# Patient Record
Sex: FEMALE | Race: OTHER | Employment: OTHER | URBAN - METROPOLITAN AREA
[De-identification: names, ages, dates, MRNs, and addresses within clinical notes are randomized per-mention and may not be internally consistent; named-entity substitution may affect disease eponyms.]

---

## 2020-02-12 ENCOUNTER — OFFICE VISIT (OUTPATIENT)
Dept: OBGYN CLINIC | Facility: CLINIC | Age: 40
End: 2020-02-12
Payer: COMMERCIAL

## 2020-02-12 VITALS
DIASTOLIC BLOOD PRESSURE: 68 MMHG | HEIGHT: 61 IN | SYSTOLIC BLOOD PRESSURE: 112 MMHG | WEIGHT: 141 LBS | BODY MASS INDEX: 26.62 KG/M2

## 2020-02-12 DIAGNOSIS — Z12.31 SCREENING MAMMOGRAM, ENCOUNTER FOR: ICD-10-CM

## 2020-02-12 DIAGNOSIS — Z01.419 WELL WOMAN EXAM WITH ROUTINE GYNECOLOGICAL EXAM: Primary | ICD-10-CM

## 2020-02-12 DIAGNOSIS — Z87.42 HISTORY OF OVARIAN CYST: ICD-10-CM

## 2020-02-12 DIAGNOSIS — N92.6 MISSED MENSES: ICD-10-CM

## 2020-02-12 PROBLEM — O09.511 PRIMIGRAVIDA OF ADVANCED MATERNAL AGE IN FIRST TRIMESTER: Status: ACTIVE | Noted: 2020-02-12

## 2020-02-12 LAB
CONTROL LINE PRESENT WITH A CLEAR BACKGROUND (YES/NO): YES YES/NO
KIT LOT #: NORMAL NUMERIC

## 2020-02-12 PROCEDURE — 99203 OFFICE O/P NEW LOW 30 MIN: CPT | Performed by: OBSTETRICS & GYNECOLOGY

## 2020-02-12 PROCEDURE — 81025 URINE PREGNANCY TEST: CPT | Performed by: OBSTETRICS & GYNECOLOGY

## 2020-02-12 NOTE — PROGRESS NOTES
HPI:    Patient ID: Alba Genao is a 35year old female. Patient here for PCV. Had a cyst in [facility] and took OCPs x 3 months. LMP 12/30/2019. Unsure of dates. Will send for OB U/S to confirm dates. Risk Factor is AMA. Diet and exercise discussed.

## 2020-02-13 ENCOUNTER — TELEPHONE (OUTPATIENT)
Dept: OBGYN CLINIC | Facility: CLINIC | Age: 40
End: 2020-02-13

## 2020-02-13 DIAGNOSIS — N92.6 MISSED MENSES: Primary | ICD-10-CM

## 2020-02-14 NOTE — TELEPHONE ENCOUNTER
RN returned call to patient who indicates that she has active insurance and would like to have her chart updated to reflect this information so that insurance is billed (not her) for office visit yesterday.       Pt also indicates that she was advised by in

## 2020-02-17 ENCOUNTER — APPOINTMENT (OUTPATIENT)
Dept: LAB | Facility: HOSPITAL | Age: 40
End: 2020-02-17
Attending: OBSTETRICS & GYNECOLOGY
Payer: COMMERCIAL

## 2020-02-17 LAB — B-HCG SERPL-ACNC: ABNORMAL MIU/ML

## 2020-02-17 PROCEDURE — 36415 COLL VENOUS BLD VENIPUNCTURE: CPT | Performed by: OBSTETRICS & GYNECOLOGY

## 2020-02-17 PROCEDURE — 84702 CHORIONIC GONADOTROPIN TEST: CPT | Performed by: OBSTETRICS & GYNECOLOGY

## 2020-02-18 ENCOUNTER — TELEPHONE (OUTPATIENT)
Dept: OBGYN CLINIC | Facility: CLINIC | Age: 40
End: 2020-02-18

## 2020-02-18 NOTE — TELEPHONE ENCOUNTER
----- Message from Erin Valverde MD sent at 2/17/2020  8:28 PM CST -----  [facility] is over 30,000. Patient can schedule her OB U/S if she straightened out her Insurance issues. Call patient.

## 2020-02-25 ENCOUNTER — NURSE ONLY (OUTPATIENT)
Dept: OBGYN CLINIC | Facility: CLINIC | Age: 40
End: 2020-02-25
Payer: COMMERCIAL

## 2020-02-25 DIAGNOSIS — Z34.01 ENCOUNTER FOR SUPERVISION OF NORMAL FIRST PREGNANCY IN FIRST TRIMESTER: Primary | ICD-10-CM

## 2020-02-25 NOTE — PROGRESS NOTES
OB History     T0    L0    SAB0  TAB0  Ectopic0  Multiple0  Live Births0         Pt is here today for RN [employer].     Missed menses apt with: MLM   LMP: 2019    Pre  BMI: 26.29     EPDS score:   +UPT at home: 2020  +UP

## 2020-02-26 ENCOUNTER — HOSPITAL ENCOUNTER (OUTPATIENT)
Dept: ULTRASOUND IMAGING | Age: 40
Discharge: HOME | End: 2020-02-26
Attending: OBSTETRICS & GYNECOLOGY
Payer: COMMERCIAL

## 2020-02-26 ENCOUNTER — APPOINTMENT (OUTPATIENT)
Dept: LAB | Age: 40
End: 2020-02-26
Attending: OBSTETRICS & GYNECOLOGY
Payer: COMMERCIAL

## 2020-02-26 DIAGNOSIS — Z87.42 HISTORY OF OVARIAN CYST: ICD-10-CM

## 2020-02-26 PROCEDURE — 76801 OB US < 14 WKS SINGLE FETUS: CPT | Performed by: OBSTETRICS & GYNECOLOGY

## 2020-02-28 ENCOUNTER — TELEPHONE (OUTPATIENT)
Dept: OBGYN CLINIC | Facility: CLINIC | Age: 40
End: 2020-02-28

## 2020-02-28 RX ORDER — AMOXICILLIN 500 MG/1
500 CAPSULE ORAL 3 TIMES DAILY
Qty: 21 CAPSULE | Refills: 0 | Status: SHIPPED | OUTPATIENT
Start: 2020-02-28 | End: 2020-03-06

## 2020-02-28 NOTE — TELEPHONE ENCOUNTER
Pt asked how soon could she schedule next PN apt which would be NOB with MLM, pt informed she could make it around 11-12 weeks gestation. Pt asked if there was any way to schedule sooner since she wanted reassurance. Denied any cramping or spotting.  Per pt

## 2020-02-28 NOTE — TELEPHONE ENCOUNTER
Pt currently dates [id number] scheduled for NOB with MLM on 3/11 where she will be 10 weeks. No further questions.

## 2020-02-28 NOTE — TELEPHONE ENCOUNTER
The earliest fhr can be heard abdominally is ~10 weeks so she can schedule her appt for two weeks from now. It can be a NPN visit with Dr. Tamara Frank as he saw her for pcv.   If she has any clinical problem of concern such as bleeding, severe pain, illness, or se

## 2020-02-28 NOTE — TELEPHONE ENCOUNTER
Pt is calling to speak to a nurse regarding a results     [phone]  Irene number    Wants to know if she calls back can she be put through to a  Nurse since she is calling from [address] [place] [address],Sixth Floor speaker

## 2020-02-28 NOTE — TELEPHONE ENCOUNTER
Pt called regarding recent blood work. Reviewed PN OB  panel results, including positive GBS in urine and need for Amoxicillin and penicillin in pregnancy. Pt verbalized understanding.    Noted blood type and antibody screen weren't resulted, per Kellen Dawson from

## 2020-02-29 LAB
ABSOLUTE BASOPHILS: 40 CELLS/UL (ref 0–200)
ABSOLUTE EOSINOPHILS: 48 CELLS/UL (ref 15–500)
ABSOLUTE LYMPHOCYTES: 1648 CELLS/UL (ref 850–3900)
ABSOLUTE MONOCYTES: 520 CELLS/UL (ref 200–950)
ABSOLUTE NEUTROPHILS: 5744 CELLS/UL (ref 1500–7800)
BASOPHILS: 0.5 %
BILIRUBIN: NEGATIVE
COLOR: YELLOW
EOSINOPHILS: 0.6 %
GLUCOSE: NEGATIVE
HEMATOCRIT: 39.2 % (ref 35–45)
HEMOGLOBIN: 13.5 G/DL (ref 11.7–15.5)
KETONES: NEGATIVE
LEUKOCYTE ESTERASE: NEGATIVE
LYMPHOCYTES: 20.6 %
MCH: 30.3 PG (ref 27–33)
MCHC: 34.4 G/DL (ref 32–36)
MCV: 88.1 FL (ref 80–100)
MONOCYTES: 6.5 %
MPV: 10.5 FL (ref 7.5–12.5)
NEUTROPHILS: 71.8 %
NITRITE: NEGATIVE
OCCULT BLOOD: NEGATIVE
PH: 6.5 (ref 5–8)
PLATELET COUNT: 309 THOUSAND/UL (ref 140–400)
PROTEIN: NEGATIVE
RDW: 12.3 % (ref 11–15)
RED BLOOD CELL COUNT: 4.45 MILLION/UL (ref 3.8–5.1)
RUBELLA ANTIBODY (IGG): 3 INDEX
SPECIFIC GRAVITY: 1.01 (ref 1–1.03)
WHITE BLOOD CELL COUNT: 8 THOUSAND/UL (ref 3.8–10.8)

## 2020-03-11 ENCOUNTER — INITIAL PRENATAL (OUTPATIENT)
Dept: OBGYN CLINIC | Facility: CLINIC | Age: 40
End: 2020-03-11
Payer: COMMERCIAL

## 2020-03-11 VITALS — WEIGHT: 141.38 LBS | DIASTOLIC BLOOD PRESSURE: 68 MMHG | SYSTOLIC BLOOD PRESSURE: 108 MMHG | BODY MASS INDEX: 27 KG/M2

## 2020-03-11 DIAGNOSIS — Z34.81 ENCOUNTER FOR SUPERVISION OF OTHER NORMAL PREGNANCY IN FIRST TRIMESTER: Primary | ICD-10-CM

## 2020-03-11 NOTE — PROGRESS NOTES
Pap Done. GC/Chlamydia Culture Done. Still thinking about Genetic screening. Received Flu vaccine this season per patient.

## 2020-03-12 ENCOUNTER — TELEPHONE (OUTPATIENT)
Dept: OBGYN CLINIC | Facility: CLINIC | Age: 40
End: 2020-03-12

## 2020-03-12 LAB
CHLAMYDIA TRACHOMATIS$RNA, TMA: NOT DETECTED
HPV MRNA E6/E7: NOT DETECTED
NEISSERIA GONORRHOEAE$RNA, TMA: NOT DETECTED

## 2020-03-12 NOTE — TELEPHONE ENCOUNTER
Dialed 0 and was unable to connect to the patient. If pt rc pls put through to nurses as it is difficult to connect to pt.

## 2020-03-14 NOTE — TELEPHONE ENCOUNTER
Dialed 0 and attempted to contact pt once again and pt answered but she was unable to hear me speaking to her. Attempted again but could not get a hold of pt. If she rc, pls put through to nurses as it is very difficult to get a hold of pt.

## 2020-03-16 ENCOUNTER — TELEPHONE (OUTPATIENT)
Dept: OBGYN CLINIC | Facility: CLINIC | Age: 40
End: 2020-03-16

## 2020-03-16 DIAGNOSIS — O09.521 AMA (ADVANCED MATERNAL AGE) MULTIGRAVIDA 35+, FIRST TRIMESTER: Primary | ICD-10-CM

## 2020-03-16 RX ORDER — ASCORBIC ACID, CHOLECALCIFEROL, .ALPHA.-TOCOPHEROL, DL-, PYRIDOXINE HYDROCHLORIDE, FOLIC ACID, CYANOCOBALAMIN, CALCIUM CARBONATE, FERROUS FUMARATE, MAGNESIUM OXIDE AND DOCONEXENT 90; 220; 10; 26; 1; 13; 145; 28; 50; 300 MG/1; [IU]/1; [IU]/1; MG/1; MG/1; UG/1; MG/1; MG/1; MG/1; MG/1
1 CAPSULE, GELATIN COATED ORAL DAILY
Qty: 90 CAPSULE | Refills: 3 | Status: SHIPPED | OUTPATIENT
Start: 2020-03-16 | End: 2020-03-19

## 2020-03-16 NOTE — TELEPHONE ENCOUNTER
Pls put pt through to nurses if pt rc or calls office. It is very difficult to get a hold of pt as her number needs to be dialed out in a specific way. Unable to leave a message to call back.

## 2020-03-16 NOTE — TELEPHONE ENCOUNTER
Pt returning call regarding prenatals let her know she can take otc while she waits for prenatals to be sent to pharmacy.  Pt asked about flu vaccine because she called her job to ask of she had it in 2019 and they told her she hasn't had one so I let her k

## 2020-03-17 NOTE — TELEPHONE ENCOUNTER
Pt wanted number to MFM as she would like to complete MFM FTS. Order placed and pt given number to MFM. Pt had no other questions.

## 2020-03-18 NOTE — TELEPHONE ENCOUNTER
Pt Name and  verified. Pt is scheduled to see MFM and just wanted to make sure that this was an okay time frame. Pt advised that the FTS can be done between 11 and 13 weeks gestation. Pt voiced understanding.  Pt is requesting a note indicating that she

## 2020-03-18 NOTE — TELEPHONE ENCOUNTER
Pt Name and  verified. Pt informed that letter is ready and pt states she will come in to . Placed with [address] staff.

## 2020-03-19 ENCOUNTER — TELEPHONE (OUTPATIENT)
Dept: OBGYN CLINIC | Facility: CLINIC | Age: 40
End: 2020-03-19

## 2020-03-19 RX ORDER — PNV,CALCIUM 72/IRON/FOLIC ACID 27 MG-1 MG
1 TABLET ORAL DAILY
Qty: 30 TABLET | Refills: 3 | Status: SHIPPED | OUTPATIENT
Start: 2020-03-19 | End: 2020-04-18

## 2020-03-19 NOTE — TELEPHONE ENCOUNTER
Pt informed patient may obtain over the counter vitamins. Per pt she has a list of insurance approved vitamins that she would like our office to choose from if possible.  Per pt she will be coming later today to  a letter so she will drop it off at t

## 2020-03-19 NOTE — TELEPHONE ENCOUNTER
Formulary reviewed, consulted with AJB in office. Sent one of the PN vitamins from list to pt's pharmacy. Pharmacy to inform pt of medication  status.

## 2020-03-23 ENCOUNTER — TELEPHONE (OUTPATIENT)
Dept: OBGYN CLINIC | Facility: CLINIC | Age: 40
End: 2020-03-23

## 2020-03-23 ENCOUNTER — TELEPHONE (OUTPATIENT)
Dept: PERINATAL CARE | Facility: HOSPITAL | Age: 40
End: 2020-03-23

## 2020-03-23 NOTE — TELEPHONE ENCOUNTER
Office called to notify that we do not accept Ambetter. I attempted to call patient on number provided but got voicemail. I did not leave a voicemail. Also attempted to call brothers phone number provided on her record but phone rang and then fast busy.

## 2020-03-23 NOTE — TELEPHONE ENCOUNTER
Per Levon Chaidez from [facility] patient has [employer] which [facility] does not accept. Patient was scheduled for a FTS with a consult on 3/26/2020. Per Levon Chaidez, will call patient to cancel. Please advise. Dr. Yanick Vega overheard and states patient should go to [facility].

## 2020-03-24 ENCOUNTER — TELEPHONE (OUTPATIENT)
Dept: PERINATAL CARE | Facility: HOSPITAL | Age: 40
End: 2020-03-24

## 2020-03-24 NOTE — TELEPHONE ENCOUNTER
Patient states # is from [facility] and must [address] before phone #. In order to contact patient. ..you must Dial 0 and have  connect you to the patient.

## 2020-03-24 NOTE — TELEPHONE ENCOUNTER
Spoke to Stevie Ortiz (balaji's boyfriend at [phone]) asked him to notify Carlos Eduardo Perry that we do not accept her insurance Ambetter. Instructed him to have her call OB office for alternative testing.

## 2020-03-24 NOTE — TELEPHONE ENCOUNTER
Pt Name and  verified. Pt informed that [facility] does not take her current insurance and will need to have apt cancelled. Pt voiced understanding. Pt informed she can do quad screen or progenity/evelin for genetic screening.  Pt voiced understanding, but wan

## 2020-03-24 NOTE — TELEPHONE ENCOUNTER
Pt Name and  verified. Per pt she called her insurance and states the following clinic : [facility] is covered. Advised pt that this is just an imaging center/outside lab and it is not the specialty Maternal Fetal Medicine.  Pt states t

## 2020-04-09 ENCOUNTER — ROUTINE PRENATAL (OUTPATIENT)
Dept: OBGYN CLINIC | Facility: CLINIC | Age: 40
End: 2020-04-09
Payer: COMMERCIAL

## 2020-04-09 VITALS — DIASTOLIC BLOOD PRESSURE: 62 MMHG | BODY MASS INDEX: 27 KG/M2 | SYSTOLIC BLOOD PRESSURE: 100 MMHG | WEIGHT: 142 LBS

## 2020-04-09 DIAGNOSIS — Z34.92 NORMAL PREGNANCY IN SECOND TRIMESTER: Primary | ICD-10-CM

## 2020-04-09 DIAGNOSIS — O09.523 MULTIGRAVIDA OF ADVANCED MATERNAL AGE IN THIRD TRIMESTER: ICD-10-CM

## 2020-04-09 PROCEDURE — 81002 URINALYSIS NONAUTO W/O SCOPE: CPT | Performed by: OBSTETRICS & GYNECOLOGY

## 2020-04-21 RX ORDER — PNV,CALCIUM 72/IRON/FOLIC ACID 27 MG-1 MG
1 TABLET ORAL DAILY
Qty: 30 TABLET | Refills: 3 | Status: SHIPPED | OUTPATIENT
Start: 2020-04-21 | End: 2020-05-07

## 2020-04-24 ENCOUNTER — TELEPHONE (OUTPATIENT)
Dept: OBGYN CLINIC | Facility: CLINIC | Age: 40
End: 2020-04-24

## 2020-04-24 ENCOUNTER — TELEPHONE (OUTPATIENT)
Dept: PERINATAL CARE | Facility: HOSPITAL | Age: 40
End: 2020-04-24

## 2020-04-24 NOTE — TELEPHONE ENCOUNTER
[facility] Maternal Fetal Medicine number :    [phone], ext. [phone]. Per GERMAN avila to speak with boyfriend: Adinbinh Barakat. Called number to reach him and provide him with the above information.  Was unable to leave a message as rivas

## 2020-04-24 NOTE — TELEPHONE ENCOUNTER
----- Message from [facility] sent at 4/24/2020  4:06 PM CDT -----  Regarding: AMBETTER INSURED PATIENTS  PATIENT'S BOYFRIEND, LORRAINE, CALLED TO SCHEDULED LEVEL 2 U/S BUT GAVIN WOULD NOT BE COVERED BECAUSE SHE HAS AMBETTER INSURANCE.  I INFORMED HIM TO CA

## 2020-04-24 NOTE — TELEPHONE ENCOUNTER
SPOKE TO PATIENT'S BOYFRIEND, LORRAINE, DIRECTED BACK TO OB'S OFFICE FOR GUIDANCE ON WHERE PATIENT'S INSURANCE WOULD BE VALID. CONTACT PATIENT BOYFRIEND -[phone].

## 2020-04-25 ENCOUNTER — TELEPHONE (OUTPATIENT)
Dept: OBGYN CLINIC | Facility: CLINIC | Age: 40
End: 2020-04-25

## 2020-04-25 NOTE — TELEPHONE ENCOUNTER
Unable to leave a vm, vm full:     pls put pt through to nurses as it is difficult to dial out to pt

## 2020-04-28 ENCOUNTER — TELEPHONE (OUTPATIENT)
Dept: OBGYN CLINIC | Facility: CLINIC | Age: 40
End: 2020-04-28

## 2020-04-28 NOTE — TELEPHONE ENCOUNTER
Called  to have them dial out. Call rang multiple times then o answer. Replied to patient through my chart. If patient calls back, please try transferring to nurse line since its difficult to connect with patient.

## 2020-05-07 ENCOUNTER — ROUTINE PRENATAL (OUTPATIENT)
Dept: OBGYN CLINIC | Facility: CLINIC | Age: 40
End: 2020-05-07
Payer: COMMERCIAL

## 2020-05-07 VITALS — DIASTOLIC BLOOD PRESSURE: 60 MMHG | BODY MASS INDEX: 28 KG/M2 | SYSTOLIC BLOOD PRESSURE: 102 MMHG | WEIGHT: 148 LBS

## 2020-05-07 DIAGNOSIS — Z34.92 NORMAL PREGNANCY IN SECOND TRIMESTER: Primary | ICD-10-CM

## 2020-05-07 DIAGNOSIS — O09.511 PRIMIGRAVIDA OF ADVANCED MATERNAL AGE IN FIRST TRIMESTER: ICD-10-CM

## 2020-05-07 PROCEDURE — 81002 URINALYSIS NONAUTO W/O SCOPE: CPT | Performed by: OBSTETRICS & GYNECOLOGY

## 2020-05-07 RX ORDER — PNV,CALCIUM 72/IRON/FOLIC ACID 27 MG-1 MG
1 TABLET ORAL DAILY
Qty: 90 TABLET | Refills: 3 | Status: SHIPPED | OUTPATIENT
Start: 2020-05-07 | End: 2020-06-06

## 2020-05-20 ENCOUNTER — TELEPHONE (OUTPATIENT)
Dept: OBGYN CLINIC | Facility: CLINIC | Age: 40
End: 2020-05-20

## 2020-05-20 NOTE — TELEPHONE ENCOUNTER
Pt. has questions for the nurse about her pregnancy, as she wants to know if any forms needs to be completed in advance, as she has [facility] [employer] Pt. Also wants to know if prior Sabino Congo is needed for her to have delivery done at [facility]?  Pt. Only speaks

## 2020-05-27 ENCOUNTER — TELEPHONE (OUTPATIENT)
Dept: OBGYN CLINIC | Facility: CLINIC | Age: 40
End: 2020-05-27

## 2020-05-27 DIAGNOSIS — O44.00 PLACENTA PREVIA WITHOUT HEMORRHAGE, ANTEPARTUM: Primary | ICD-10-CM

## 2020-05-27 NOTE — TELEPHONE ENCOUNTER
I attempted to reach the patient at her home phone number and there was no answer or voicemail. I tried calling her mobile number and it appears to be an incorrect number.     Please inform the patient that I received the results of her level 2 ultrasound

## 2020-05-28 ENCOUNTER — TELEPHONE (OUTPATIENT)
Dept: OBGYN CLINIC | Facility: CLINIC | Age: 40
End: 2020-05-28

## 2020-05-28 NOTE — TELEPHONE ENCOUNTER
Per MFM dept pt may have f/u u/s if was performed at external [facility] dept. M to review external records once scanned in system.

## 2020-05-28 NOTE — TELEPHONE ENCOUNTER
Per pt's partner, Pt was not with him at this time, but requested to give message. Per FYi in chart ok to give information. Partner informed of AJb review and plan to repeat u/s at 28 weeks.  Verbalized understanding and stated their insurance changed back

## 2020-05-28 NOTE — TELEPHONE ENCOUNTER
Called pt to inform per ASJ, ok for walking 20 minutes daily as long as not cramping or bleeding. Pt verbalized understanding. Stated she would like to try having repeat/ f/u u/s through [facility] since her insurance now if [employer].  Pt informed

## 2020-05-28 NOTE — TELEPHONE ENCOUNTER
----- Message from Allison Daugherty sent at 5/28/2020  4:04 PM CDT -----  Regarding: RE:Ultrasonido  Contact: [phone]  Buenas tardes nuevamente, ya hablen con mi novio y me explico que le llamaron y le explicaron el resultado, muchas rochelle por eso.   Franklin Jarvis

## 2020-05-28 NOTE — TELEPHONE ENCOUNTER
Sent pt a my chart message to relay AJB's message below. Contacted office of Dr. Miroslava Poole to request another order for f/u u/s. Await order to fill out and fax back. Pt advised through my chart to call after one week to request an apt.

## 2020-05-28 NOTE — TELEPHONE ENCOUNTER
Called pt to inform per ASJ, ok for walking 20 minutes daily as long as not cramping or bleeding. Pt verbalized understanding. Stated she would like to try having repeat/ f/u u/s through [facility] since her insurance now if [employer].  Pt informed

## 2020-06-01 ENCOUNTER — TELEPHONE (OUTPATIENT)
Dept: PERINATAL CARE | Facility: HOSPITAL | Age: 40
End: 2020-06-01

## 2020-06-01 NOTE — TELEPHONE ENCOUNTER
STAFF MESSAGE SENT REQUESTING PRIOR AUTHORIZATION FOR PATIENT SCHEDULED 7/15/20 FOR [facility] U/S.

## 2020-06-01 NOTE — TELEPHONE ENCOUNTER
Growth u/s ordered through [address]. Staff to f/u on scanning of Level II results through [facility] for growth u/s- to be done at 28 weeks.

## 2020-06-02 ENCOUNTER — ROUTINE PRENATAL (OUTPATIENT)
Dept: OBGYN CLINIC | Facility: CLINIC | Age: 40
End: 2020-06-02
Payer: MEDICAID

## 2020-06-02 VITALS
HEART RATE: 73 BPM | DIASTOLIC BLOOD PRESSURE: 66 MMHG | SYSTOLIC BLOOD PRESSURE: 103 MMHG | WEIGHT: 148 LBS | BODY MASS INDEX: 28 KG/M2

## 2020-06-02 DIAGNOSIS — Z34.82 ENCOUNTER FOR SUPERVISION OF OTHER NORMAL PREGNANCY IN SECOND TRIMESTER: Primary | ICD-10-CM

## 2020-06-02 PROBLEM — O44.00 PLACENTA PREVIA WITHOUT HEMORRHAGE, ANTEPARTUM: Status: ACTIVE | Noted: 2020-06-02

## 2020-06-02 PROBLEM — O09.519 ADVANCED MATERNAL AGE, PRIMIGRAVIDA, ANTEPARTUM: Status: ACTIVE | Noted: 2020-02-12

## 2020-06-02 PROCEDURE — 0502F SUBSEQUENT PRENATAL CARE: CPT | Performed by: OBSTETRICS & GYNECOLOGY

## 2020-06-02 PROCEDURE — 81002 URINALYSIS NONAUTO W/O SCOPE: CPT | Performed by: OBSTETRICS & GYNECOLOGY

## 2020-06-02 NOTE — PROGRESS NOTES
[facility], [facility]  Obstetrics and Gynecology  Prenatal Visit  Nick Arrington MD    HPI   Inez Favre is a 35year old. o.  22w1d weeks. Here for routine prenatal visit and is without complaints.   Patient denies any regular uterine contractions, spo

## 2020-06-11 NOTE — TELEPHONE ENCOUNTER
Primary Diagnosis Code: O44.00 Description: Complete placenta previa NOS or without hemorrhage, unspecified trimester  Secondary Diagnosis Code:  Description:   Date of Service: Not provided    CPT Code: OBUS Description: OB Ultrasound  Case Number: [id number]

## 2020-06-11 NOTE — TELEPHONE ENCOUNTER
Ultrasound for Level 2 through [facility] has been placed in the system under Media Tab.      See telephone encounter 6/1/2020 for Growth US PA

## 2020-07-07 NOTE — TELEPHONE ENCOUNTER
Authorization Number: [id number]  Case Number: [id number]     Status: Approved  P2P Status:   Approval Date: 6/13/2020 12:00:00 AM  Service Code: OBUS  Service Description: OB Ultrasound  Site Name: [address]  Expiration Date: 3/10/2021  D

## 2020-07-13 ENCOUNTER — PATIENT MESSAGE (OUTPATIENT)
Dept: OBGYN CLINIC | Facility: CLINIC | Age: 40
End: 2020-07-13

## 2020-07-13 NOTE — TELEPHONE ENCOUNTER
Pt Name and  verified. Pt informed that she can complete 1 hr and CBC at any time before her apt. No further questions. Pt had multiple questions about her US with [facility] and their visitor policy.  Advised pt to call MFM and ask them for their visitor james

## 2020-07-15 ENCOUNTER — APPOINTMENT (OUTPATIENT)
Dept: LAB | Facility: HOSPITAL | Age: 40
End: 2020-07-15
Attending: OBSTETRICS & GYNECOLOGY
Payer: MEDICAID

## 2020-07-15 ENCOUNTER — HOSPITAL ENCOUNTER (OUTPATIENT)
Dept: PERINATAL CARE | Facility: HOSPITAL | Age: 40
Discharge: HOME | End: 2020-07-15
Attending: OBSTETRICS & GYNECOLOGY
Payer: MEDICAID

## 2020-07-15 ENCOUNTER — TELEPHONE (OUTPATIENT)
Dept: PERINATAL CARE | Facility: HOSPITAL | Age: 40
End: 2020-07-15

## 2020-07-15 VITALS
SYSTOLIC BLOOD PRESSURE: 134 MMHG | HEART RATE: 80 BPM | WEIGHT: 157 LBS | BODY MASS INDEX: 30 KG/M2 | DIASTOLIC BLOOD PRESSURE: 79 MMHG

## 2020-07-15 DIAGNOSIS — O09.519 ADVANCED MATERNAL AGE, PRIMIGRAVIDA, ANTEPARTUM: ICD-10-CM

## 2020-07-15 DIAGNOSIS — O09.523 AMA (ADVANCED MATERNAL AGE) MULTIGRAVIDA 35+, THIRD TRIMESTER: Primary | ICD-10-CM

## 2020-07-15 DIAGNOSIS — O44.00 PLACENTA PREVIA WITHOUT HEMORRHAGE, ANTEPARTUM: Primary | ICD-10-CM

## 2020-07-15 DIAGNOSIS — O24.410 DIET CONTROLLED GESTATIONAL DIABETES MELLITUS (GDM) IN THIRD TRIMESTER: ICD-10-CM

## 2020-07-15 DIAGNOSIS — O44.00 PLACENTA PREVIA WITHOUT HEMORRHAGE, ANTEPARTUM: ICD-10-CM

## 2020-07-15 LAB
BASOPHILS # BLD AUTO: 0.03 X10(3) UL (ref 0–0.2)
BASOPHILS NFR BLD AUTO: 0.4 %
DEPRECATED RDW RBC AUTO: 41.6 FL (ref 35.1–46.3)
EOSINOPHIL # BLD AUTO: 0.03 X10(3) UL (ref 0–0.7)
EOSINOPHIL NFR BLD AUTO: 0.4 %
ERYTHROCYTE [DISTWIDTH] IN BLOOD BY AUTOMATED COUNT: 12.9 % (ref 11–15)
GLUCOSE 1H P GLC SERPL-MCNC: 144 MG/DL
HCT VFR BLD AUTO: 35.5 % (ref 35–48)
HGB BLD-MCNC: 12.4 G/DL (ref 12–16)
IMM GRANULOCYTES # BLD AUTO: 0.03 X10(3) UL (ref 0–1)
IMM GRANULOCYTES NFR BLD: 0.4 %
LYMPHOCYTES # BLD AUTO: 1.46 X10(3) UL (ref 1–4)
LYMPHOCYTES NFR BLD AUTO: 21.7 %
MCH RBC QN AUTO: 30.8 PG (ref 26–34)
MCHC RBC AUTO-ENTMCNC: 34.9 G/DL (ref 31–37)
MCV RBC AUTO: 88.1 FL (ref 80–100)
MONOCYTES # BLD AUTO: 0.49 X10(3) UL (ref 0.1–1)
MONOCYTES NFR BLD AUTO: 7.3 %
NEUTROPHILS # BLD AUTO: 4.68 X10 (3) UL (ref 1.5–7.7)
NEUTROPHILS # BLD AUTO: 4.68 X10(3) UL (ref 1.5–7.7)
NEUTROPHILS NFR BLD AUTO: 69.8 %
PLATELET # BLD AUTO: 258 10(3)UL (ref 150–450)
RBC # BLD AUTO: 4.03 X10(6)UL (ref 3.8–5.3)
WBC # BLD AUTO: 6.7 X10(3) UL (ref 4–11)

## 2020-07-15 PROCEDURE — 99204 OFFICE O/P NEW MOD 45 MIN: CPT | Performed by: OBSTETRICS & GYNECOLOGY

## 2020-07-15 PROCEDURE — 36415 COLL VENOUS BLD VENIPUNCTURE: CPT | Performed by: OBSTETRICS & GYNECOLOGY

## 2020-07-15 PROCEDURE — 76816 OB US FOLLOW-UP PER FETUS: CPT | Performed by: OBSTETRICS & GYNECOLOGY

## 2020-07-15 PROCEDURE — 85025 COMPLETE CBC W/AUTO DIFF WBC: CPT | Performed by: OBSTETRICS & GYNECOLOGY

## 2020-07-15 PROCEDURE — 82950 GLUCOSE TEST: CPT | Performed by: OBSTETRICS & GYNECOLOGY

## 2020-07-15 NOTE — PROGRESS NOTES
Reason for Consult:   Dear Dr. Nancy Mustafa,    Thank you for requesting ultrasound evaluation and maternal fetal medicine consultation on [place]. As you are aware she is a 35year old female with a Handy pregnancy at 28w2d.   A maternal-fetal medi issues: We discussed the risks of placenta previa including IUGR,  delivery, hemorrhage, need for hospitalization and placenta accreta(6%).   If diagnosed in the early 2nd trimester about 95% will not remain a placenta previa and should be evalua you for allowing me to participate in the care of your patient. Please do not hesitate to call with any questions or concerns. Total patient time was 45 minutes in evaluation, consultation, and coordination of care.   Greater than 50% of this time was

## 2020-07-16 ENCOUNTER — TELEPHONE (OUTPATIENT)
Dept: OBGYN CLINIC | Facility: CLINIC | Age: 40
End: 2020-07-16

## 2020-07-16 DIAGNOSIS — R73.09 ELEVATED GLUCOSE TOLERANCE TEST: Primary | ICD-10-CM

## 2020-07-16 NOTE — TELEPHONE ENCOUNTER
----- Message from Cornelio George MD sent at 7/16/2020  5:08 AM CDT -----  Please notify patient of elevated 50 gram glucola and have her scheduled for a 3 hr GTT.

## 2020-07-17 ENCOUNTER — LABORATORY ENCOUNTER (OUTPATIENT)
Dept: LAB | Facility: HOSPITAL | Age: 40
End: 2020-07-17
Attending: OBSTETRICS & GYNECOLOGY
Payer: MEDICAID

## 2020-07-17 ENCOUNTER — TELEPHONE (OUTPATIENT)
Dept: OBGYN CLINIC | Facility: CLINIC | Age: 40
End: 2020-07-17

## 2020-07-17 DIAGNOSIS — O24.410 DIET CONTROLLED GESTATIONAL DIABETES MELLITUS (GDM) IN THIRD TRIMESTER: Primary | ICD-10-CM

## 2020-07-17 DIAGNOSIS — R73.09 ELEVATED GLUCOSE TOLERANCE TEST: ICD-10-CM

## 2020-07-17 LAB
1 HR GLUCOSE GESTATIONAL: 162 MG/DL
GLUCOSE 1H P GLC SERPL-MCNC: 170 MG/DL
GLUCOSE 3H P GLC SERPL-MCNC: 158 MG/DL
GLUCOSE BLD-MCNC: 83 MG/DL (ref 70–99)
GLUCOSE P FAST SERPL-MCNC: 84 MG/DL

## 2020-07-17 PROCEDURE — 36415 COLL VENOUS BLD VENIPUNCTURE: CPT

## 2020-07-17 PROCEDURE — 82952 GTT-ADDED SAMPLES: CPT

## 2020-07-17 PROCEDURE — 82951 GLUCOSE TOLERANCE TEST (GTT): CPT

## 2020-07-17 PROCEDURE — 82962 GLUCOSE BLOOD TEST: CPT

## 2020-07-17 NOTE — TELEPHONE ENCOUNTER
----- Message from Db Lazcano MD sent at 7/17/2020 12:07 PM CDT -----  Please notify patient of abnormal 3 hour Glucola testing and the diagnosis of gestational diabetes.   Patient will need to go for diabetic education and learn to do 4 times daily

## 2020-07-18 ENCOUNTER — ROUTINE PRENATAL (OUTPATIENT)
Dept: OBGYN CLINIC | Facility: CLINIC | Age: 40
End: 2020-07-18
Payer: MEDICAID

## 2020-07-18 VITALS — WEIGHT: 150 LBS | SYSTOLIC BLOOD PRESSURE: 122 MMHG | DIASTOLIC BLOOD PRESSURE: 70 MMHG | BODY MASS INDEX: 28 KG/M2

## 2020-07-18 DIAGNOSIS — Z34.92 NORMAL PREGNANCY IN SECOND TRIMESTER: Primary | ICD-10-CM

## 2020-07-18 LAB
APPEARANCE: CLEAR
MULTISTIX LOT#: NORMAL NUMERIC
PH, URINE: 5 (ref 4.5–8)
SPECIFIC GRAVITY: 1.02 (ref 1–1.03)
URINE-COLOR: YELLOW
UROBILINOGEN,SEMI-QN: 0.2 MG/DL (ref 0–1.9)

## 2020-07-18 PROCEDURE — 3078F DIAST BP <80 MM HG: CPT | Performed by: OBSTETRICS & GYNECOLOGY

## 2020-07-18 PROCEDURE — 81002 URINALYSIS NONAUTO W/O SCOPE: CPT | Performed by: OBSTETRICS & GYNECOLOGY

## 2020-07-18 PROCEDURE — 0502F SUBSEQUENT PRENATAL CARE: CPT | Performed by: OBSTETRICS & GYNECOLOGY

## 2020-07-18 PROCEDURE — 3074F SYST BP LT 130 MM HG: CPT | Performed by: OBSTETRICS & GYNECOLOGY

## 2020-07-18 NOTE — PROGRESS NOTES
Complete placenta previa noted on mfm u/s,  Advised complete pelvic rest and no exercising. Per last note, pt with abnormal 3 hr, c/w gest diabetes, pt and  counseled extensively and they will start checking bs today.

## 2020-07-20 ENCOUNTER — PATIENT MESSAGE (OUTPATIENT)
Dept: OBGYN CLINIC | Facility: CLINIC | Age: 40
End: 2020-07-20

## 2020-07-21 PROBLEM — Z34.92 NORMAL PREGNANCY IN SECOND TRIMESTER: Status: RESOLVED | Noted: 2020-05-07 | Resolved: 2020-07-21

## 2020-07-22 ENCOUNTER — APPOINTMENT (OUTPATIENT)
Dept: LAB | Facility: REFERENCE LAB | Age: 40
End: 2020-07-22
Attending: GENERAL ACUTE CARE HOSPITAL
Payer: MEDICAID

## 2020-07-22 ENCOUNTER — HOSPITAL ENCOUNTER (OUTPATIENT)
Dept: ENDOCRINOLOGY | Facility: HOSPITAL | Age: 40
Discharge: HOME | End: 2020-07-22
Attending: OBSTETRICS & GYNECOLOGY
Payer: MEDICAID

## 2020-07-22 DIAGNOSIS — O24.410 DIET CONTROLLED GESTATIONAL DIABETES MELLITUS (GDM) IN THIRD TRIMESTER: ICD-10-CM

## 2020-07-22 DIAGNOSIS — E23.2 GESTATIONAL DIABETES INSIPIDUS (HCC): Primary | ICD-10-CM

## 2020-07-22 DIAGNOSIS — O99.280 GESTATIONAL DIABETES INSIPIDUS (HCC): Primary | ICD-10-CM

## 2020-07-22 LAB
EST. AVERAGE GLUCOSE BLD GHB EST-MCNC: 108 MG/DL (ref 68–126)
HBA1C MFR BLD HPLC: 5.4 % (ref ?–5.7)

## 2020-07-22 PROCEDURE — 87086 URINE CULTURE/COLONY COUNT: CPT

## 2020-07-22 PROCEDURE — 83036 HEMOGLOBIN GLYCOSYLATED A1C: CPT

## 2020-07-22 PROCEDURE — 36415 COLL VENOUS BLD VENIPUNCTURE: CPT

## 2020-07-22 NOTE — PROGRESS NOTES
Bryce Langley  : 1980 was seen for Gestational Diabetes Counseling: Individual  Pt's partner agrees to interpret appointment. Pt 29 weeks pregnant with 1st baby.       Date: 2020   Start time: 9 am  End time: 10 am    Obtained usual diet histo meals   3. Bring glucose log to each MD office visit. 4. Encouraged activity if no restrictions. 5. Encouraged Nelson to call diabetes center with any questions or concerns. Patient verbalized understanding and has no further questions at this time.

## 2020-07-23 NOTE — TELEPHONE ENCOUNTER
1. IUP @  28w2d  2. Scan consistent with dates  3. No fetal structural abnormalities seen  4. Complete posterior placenta previa  5. AMA  RECOMMENDATIONS:   1. Plan delivery at 37wks  2. Growth US at [facility].   3.  Limit activity      [facility] requesting order

## 2020-08-03 ENCOUNTER — HOSPITAL ENCOUNTER (OUTPATIENT)
Dept: ENDOCRINOLOGY | Facility: HOSPITAL | Age: 40
Discharge: HOME | End: 2020-08-03
Attending: OBSTETRICS & GYNECOLOGY
Payer: MEDICAID

## 2020-08-03 VITALS — BODY MASS INDEX: 30 KG/M2 | WEIGHT: 157.63 LBS

## 2020-08-03 DIAGNOSIS — O24.410 DIET CONTROLLED GESTATIONAL DIABETES MELLITUS (GDM), ANTEPARTUM: Primary | ICD-10-CM

## 2020-08-03 NOTE — PROGRESS NOTES
Gabrielle Bragg  : 1980 was seen for GDM Follow-Up Counseling    Pt's partner agrees to interpret appointment. Pt formally declined .   1st baby - boy  Plans to deliver at 42 weeks - mid September    Date: 8/3/2020  Referring Provider: Guy Flood elevated. Taking Medication:  Reviewed appropriate timing (if on insulin) of meds. Reviewed probability of needing medication adjustments throughout pregnancy.      Reducing Risk:  Discussed management of (hyperglycemia, hypoglycemia) and when to call

## 2020-08-04 ENCOUNTER — ROUTINE PRENATAL (OUTPATIENT)
Dept: OBGYN CLINIC | Facility: CLINIC | Age: 40
End: 2020-08-04
Payer: MEDICAID

## 2020-08-04 VITALS — WEIGHT: 158 LBS | SYSTOLIC BLOOD PRESSURE: 114 MMHG | DIASTOLIC BLOOD PRESSURE: 62 MMHG | BODY MASS INDEX: 30 KG/M2

## 2020-08-04 DIAGNOSIS — Z34.03 ENCOUNTER FOR SUPERVISION OF NORMAL FIRST PREGNANCY IN THIRD TRIMESTER: Primary | ICD-10-CM

## 2020-08-04 DIAGNOSIS — O24.410 DIET CONTROLLED GESTATIONAL DIABETES MELLITUS (GDM) IN THIRD TRIMESTER: ICD-10-CM

## 2020-08-04 LAB
APPEARANCE: CLEAR
MULTISTIX LOT#: 1044 NUMERIC
PH, URINE: 6 (ref 4.5–8)
SPECIFIC GRAVITY: 1.01 (ref 1–1.03)
URINE-COLOR: YELLOW
UROBILINOGEN,SEMI-QN: 0.2 MG/DL (ref 0–1.9)

## 2020-08-04 PROCEDURE — 0502F SUBSEQUENT PRENATAL CARE: CPT | Performed by: NURSE PRACTITIONER

## 2020-08-04 PROCEDURE — 90715 TDAP VACCINE 7 YRS/> IM: CPT | Performed by: NURSE PRACTITIONER

## 2020-08-04 PROCEDURE — 3074F SYST BP LT 130 MM HG: CPT | Performed by: NURSE PRACTITIONER

## 2020-08-04 PROCEDURE — 90471 IMMUNIZATION ADMIN: CPT | Performed by: NURSE PRACTITIONER

## 2020-08-04 PROCEDURE — 81002 URINALYSIS NONAUTO W/O SCOPE: CPT | Performed by: NURSE PRACTITIONER

## 2020-08-04 PROCEDURE — 3078F DIAST BP <80 MM HG: CPT | Performed by: NURSE PRACTITIONER

## 2020-08-04 RX ORDER — PNV,CALCIUM 72/IRON/FOLIC ACID 27 MG-1 MG
1 TABLET ORAL DAILY
COMMUNITY
Start: 2020-07-28

## 2020-08-04 RX ORDER — BREAST PUMP
EACH MISCELLANEOUS
Qty: 1 EACH | Refills: 0 | Status: SHIPPED | OUTPATIENT
Start: 2020-08-04

## 2020-08-04 RX ORDER — BLOOD SUGAR DIAGNOSTIC
STRIP MISCELLANEOUS
Status: ON HOLD | COMMUNITY
Start: 2020-07-17 | End: 2020-09-18

## 2020-08-04 RX ORDER — BLOOD-GLUCOSE METER
EACH MISCELLANEOUS
COMMUNITY
Start: 2020-07-17 | End: 2020-10-02

## 2020-08-04 RX ORDER — LANCETS 30 GAUGE
EACH MISCELLANEOUS
COMMUNITY
Start: 2020-07-17

## 2020-08-04 NOTE — PROGRESS NOTES
[facility], [facility]  Obstetrics and Gynecology  28 Week Prenatal Visit  Francoise Horner, MSN, APRN, FNP-BC      HPI   Yaphank Seat is a 35year old.  31w1d weeks. ORTEGA 10/5/20. Gestational diabetic - managed with diet.  Complete previa - next US schedule 150 - on those days had an extra piece of fruit or a snack at the wrong time. - Third trimester labs ordered. Patient counseled on 35 week labs and GBS between 36-38 weeks. - Blood type A+. Rhogam - not applicable.   - Discussed kick counts - at least

## 2020-08-12 ENCOUNTER — HOSPITAL ENCOUNTER (OUTPATIENT)
Facility: HOSPITAL | Age: 40
Setting detail: OBSERVATION
Discharge: HOME | End: 2020-08-12
Attending: OBSTETRICS & GYNECOLOGY | Admitting: OBSTETRICS & GYNECOLOGY
Payer: MEDICAID

## 2020-08-12 VITALS
TEMPERATURE: 98 F | RESPIRATION RATE: 16 BRPM | SYSTOLIC BLOOD PRESSURE: 118 MMHG | DIASTOLIC BLOOD PRESSURE: 71 MMHG | HEART RATE: 71 BPM

## 2020-08-12 PROBLEM — Z34.90 PREGNANCY: Status: ACTIVE | Noted: 2020-08-12

## 2020-08-12 LAB
ALBUMIN SERPL-MCNC: 2.5 G/DL (ref 3.4–5)
ALP LIVER SERPL-CCNC: 116 U/L (ref 37–98)
ALT SERPL-CCNC: 63 U/L (ref 13–56)
AST SERPL-CCNC: 40 U/L (ref 15–37)
BILIRUB DIRECT SERPL-MCNC: 0.1 MG/DL (ref 0–0.2)
BILIRUB SERPL-MCNC: 0.4 MG/DL (ref 0.1–2)
M PROTEIN MFR SERPL ELPH: 6.5 G/DL (ref 6.4–8.2)

## 2020-08-12 PROCEDURE — 59025 FETAL NON-STRESS TEST: CPT | Performed by: OBSTETRICS & GYNECOLOGY

## 2020-08-12 NOTE — PROGRESS NOTES
Pt is a 35year old female admitted to TR4/TR4-A. Patient presents with:  Non-stress Test: r/o cholestasis     Pt is  32w2d intra-uterine pregnancy. History obtained, consents signed. Oriented to room, staff, and plan of care.

## 2020-08-12 NOTE — TRIAGE
[facility]      Triage Note    Cactus Seat Patient Status:  Observation    1980 MRN [id number]   Location [address] [place] [address] Attending Jiame Paul MD   Hosp Day # 0 [facility] [address] [employer]. Discharge education provided including labor precaution and kick counts. All questions answered.       Napoleon Treviño RN  8/12/2020 6:47 PM  Physician Evaluation      NST Interpretation: Reactive  Fetal Surveillance:  140's BPM; Fetal heart variability:

## 2020-08-13 ENCOUNTER — ROUTINE PRENATAL (OUTPATIENT)
Dept: OBGYN CLINIC | Facility: CLINIC | Age: 40
End: 2020-08-13
Payer: MEDICAID

## 2020-08-13 ENCOUNTER — TELEPHONE (OUTPATIENT)
Dept: OBGYN CLINIC | Facility: CLINIC | Age: 40
End: 2020-08-13

## 2020-08-13 VITALS — DIASTOLIC BLOOD PRESSURE: 60 MMHG | SYSTOLIC BLOOD PRESSURE: 108 MMHG | WEIGHT: 158.19 LBS | BODY MASS INDEX: 30 KG/M2

## 2020-08-13 DIAGNOSIS — L29.9 PRURITUS OF PREGNANCY IN THIRD TRIMESTER: ICD-10-CM

## 2020-08-13 DIAGNOSIS — O99.713 PRURITUS OF PREGNANCY IN THIRD TRIMESTER: ICD-10-CM

## 2020-08-13 DIAGNOSIS — Z34.83 ENCOUNTER FOR SUPERVISION OF OTHER NORMAL PREGNANCY IN THIRD TRIMESTER: Primary | ICD-10-CM

## 2020-08-13 DIAGNOSIS — K83.1 CHOLESTASIS: ICD-10-CM

## 2020-08-13 PROBLEM — Z34.90 SUPERVISION OF NORMAL PREGNANCY: Status: ACTIVE | Noted: 2020-08-12

## 2020-08-13 LAB
APPEARANCE: CLEAR
MULTISTIX LOT#: 1044 NUMERIC
PH, URINE: 5 (ref 4.5–8)
SPECIFIC GRAVITY: 1 (ref 1–1.03)
URINE-COLOR: YELLOW
UROBILINOGEN,SEMI-QN: 0.2 MG/DL (ref 0–1.9)

## 2020-08-13 PROCEDURE — 1111F DSCHRG MED/CURRENT MED MERGE: CPT | Performed by: OBSTETRICS & GYNECOLOGY

## 2020-08-13 PROCEDURE — 99213 OFFICE O/P EST LOW 20 MIN: CPT | Performed by: OBSTETRICS & GYNECOLOGY

## 2020-08-13 PROCEDURE — 81002 URINALYSIS NONAUTO W/O SCOPE: CPT | Performed by: OBSTETRICS & GYNECOLOGY

## 2020-08-13 PROCEDURE — 3078F DIAST BP <80 MM HG: CPT | Performed by: OBSTETRICS & GYNECOLOGY

## 2020-08-13 PROCEDURE — 3074F SYST BP LT 130 MM HG: CPT | Performed by: OBSTETRICS & GYNECOLOGY

## 2020-08-13 RX ORDER — URSODIOL 300 MG/1
300 CAPSULE ORAL 2 TIMES DAILY
Qty: 40 CAPSULE | Refills: 1 | Status: ON HOLD | OUTPATIENT
Start: 2020-08-13 | End: 2020-09-18

## 2020-08-13 NOTE — PROGRESS NOTES
Problem visit:  C/o pruritus bilaterally of the lower extremities and some in the thighs arms and abdomen. Reports good fetal movements.   As symptoms were suspicious for possible cholestasis of pregnancy, patient went for blood work including bile acids a

## 2020-08-13 NOTE — TELEPHONE ENCOUNTER
Pt Name and  verified. Patient informed and verbalized understanding. Added her on today at 2:20. Pt will arrive at 2:30 to office. Routing to provider and FD as FYI.

## 2020-08-13 NOTE — TELEPHONE ENCOUNTER
Regarding: Other  Contact: [phone]      ----- Message -----  From: Ankita Echols  Sent: 8/12/2020   2:04 PM CDT  To: Jesica Carpenter Ob/Gyne Clinical Staff  Subject: Other                                            ----- Message from Alma Lynch se

## 2020-08-14 LAB — BILE ACIDS, TOTAL: 8 UMOL/L

## 2020-08-19 ENCOUNTER — HOSPITAL ENCOUNTER (OUTPATIENT)
Dept: PERINATAL CARE | Facility: HOSPITAL | Age: 40
Discharge: HOME | End: 2020-08-19
Attending: OBSTETRICS & GYNECOLOGY
Payer: MEDICAID

## 2020-08-19 ENCOUNTER — TELEPHONE (OUTPATIENT)
Dept: OBGYN CLINIC | Facility: CLINIC | Age: 40
End: 2020-08-19

## 2020-08-19 ENCOUNTER — LAB ENCOUNTER (OUTPATIENT)
Dept: LAB | Facility: REFERENCE LAB | Age: 40
End: 2020-08-19
Attending: OBSTETRICS & GYNECOLOGY
Payer: MEDICAID

## 2020-08-19 DIAGNOSIS — O24.410 DIET CONTROLLED GESTATIONAL DIABETES MELLITUS (GDM) IN THIRD TRIMESTER: ICD-10-CM

## 2020-08-19 DIAGNOSIS — L29.9 PRURITUS: ICD-10-CM

## 2020-08-19 DIAGNOSIS — K83.1 CHOLESTASIS: ICD-10-CM

## 2020-08-19 DIAGNOSIS — O24.410 GESTATIONAL DIABETES, DIET CONTROLLED: ICD-10-CM

## 2020-08-19 DIAGNOSIS — R74.8 ELEVATED LIVER ENZYMES: ICD-10-CM

## 2020-08-19 DIAGNOSIS — O09.519 ADVANCED MATERNAL AGE, PRIMIGRAVIDA, ANTEPARTUM: Primary | ICD-10-CM

## 2020-08-19 DIAGNOSIS — L29.9 PRURITUS OF PREGNANCY IN THIRD TRIMESTER: ICD-10-CM

## 2020-08-19 DIAGNOSIS — Z34.03 ENCOUNTER FOR SUPERVISION OF NORMAL FIRST PREGNANCY IN THIRD TRIMESTER: ICD-10-CM

## 2020-08-19 DIAGNOSIS — O99.713 PRURITUS OF PREGNANCY IN THIRD TRIMESTER: ICD-10-CM

## 2020-08-19 LAB
ALBUMIN SERPL-MCNC: 2.7 G/DL (ref 3.4–5)
ALP LIVER SERPL-CCNC: 121 U/L (ref 37–98)
ALT SERPL-CCNC: 51 U/L (ref 13–56)
AST SERPL-CCNC: 26 U/L (ref 15–37)
BASOPHILS # BLD AUTO: 0.01 X10(3) UL (ref 0–0.2)
BASOPHILS NFR BLD AUTO: 0.2 %
BILIRUB DIRECT SERPL-MCNC: 0.1 MG/DL (ref 0–0.2)
BILIRUB SERPL-MCNC: 0.4 MG/DL (ref 0.1–2)
DEPRECATED RDW RBC AUTO: 40.8 FL (ref 35.1–46.3)
EOSINOPHIL # BLD AUTO: 0.03 X10(3) UL (ref 0–0.7)
EOSINOPHIL NFR BLD AUTO: 0.5 %
ERYTHROCYTE [DISTWIDTH] IN BLOOD BY AUTOMATED COUNT: 13.1 % (ref 11–15)
HCT VFR BLD AUTO: 39.3 % (ref 35–48)
HGB BLD-MCNC: 13.8 G/DL (ref 12–16)
IMM GRANULOCYTES # BLD AUTO: 0.02 X10(3) UL (ref 0–1)
IMM GRANULOCYTES NFR BLD: 0.4 %
LYMPHOCYTES # BLD AUTO: 1.6 X10(3) UL (ref 1–4)
LYMPHOCYTES NFR BLD AUTO: 29.2 %
M PROTEIN MFR SERPL ELPH: 7 G/DL (ref 6.4–8.2)
MCH RBC QN AUTO: 30.7 PG (ref 26–34)
MCHC RBC AUTO-ENTMCNC: 35.1 G/DL (ref 31–37)
MCV RBC AUTO: 87.3 FL (ref 80–100)
MONOCYTES # BLD AUTO: 0.43 X10(3) UL (ref 0.1–1)
MONOCYTES NFR BLD AUTO: 7.8 %
NEUTROPHILS # BLD AUTO: 3.39 X10 (3) UL (ref 1.5–7.7)
NEUTROPHILS # BLD AUTO: 3.39 X10(3) UL (ref 1.5–7.7)
NEUTROPHILS NFR BLD AUTO: 61.9 %
PLATELET # BLD AUTO: 238 10(3)UL (ref 150–450)
RBC # BLD AUTO: 4.5 X10(6)UL (ref 3.8–5.3)
WBC # BLD AUTO: 5.5 X10(3) UL (ref 4–11)

## 2020-08-19 PROCEDURE — 59025 FETAL NON-STRESS TEST: CPT | Performed by: OBSTETRICS & GYNECOLOGY

## 2020-08-19 PROCEDURE — 86780 TREPONEMA PALLIDUM: CPT

## 2020-08-19 PROCEDURE — 82239 BILE ACIDS TOTAL: CPT

## 2020-08-19 PROCEDURE — 85025 COMPLETE CBC W/AUTO DIFF WBC: CPT

## 2020-08-19 PROCEDURE — 87389 HIV-1 AG W/HIV-1&-2 AB AG IA: CPT

## 2020-08-19 PROCEDURE — 80076 HEPATIC FUNCTION PANEL: CPT

## 2020-08-19 PROCEDURE — 36415 COLL VENOUS BLD VENIPUNCTURE: CPT

## 2020-08-19 NOTE — TELEPHONE ENCOUNTER
Pt Name and  verified. Patient informed and verbalized understanding. Pt states that all other labs that had been placed were also drawn and wanted provider to be aware.  pls advise

## 2020-08-19 NOTE — ADDENDUM NOTE
Encounter addended by: Mario King MD on: 8/19/2020 4:01 PM   Actions taken: Visit diagnoses modified, Charge Capture section accepted, Clinical Note Signed

## 2020-08-19 NOTE — NST
Nonstress Test   Patient: Sidra Nieto    Gestation: 33w2d    NST: ama gdm cholestasis       Variability: Moderate           Accelerations: Yes           Decelerations: None            Baseline: 140 BPM           Uterine Irritability: No           Contrac

## 2020-08-19 NOTE — TELEPHONE ENCOUNTER
----- Message from Lyric Chan MD sent at 8/19/2020  8:58 AM CDT -----  Liver enzymes have normalized. Bile acids pending. Continue med.

## 2020-08-20 ENCOUNTER — ROUTINE PRENATAL (OUTPATIENT)
Dept: OBGYN CLINIC | Facility: CLINIC | Age: 40
End: 2020-08-20
Payer: MEDICAID

## 2020-08-20 ENCOUNTER — TELEPHONE (OUTPATIENT)
Dept: OBGYN CLINIC | Facility: CLINIC | Age: 40
End: 2020-08-20

## 2020-08-20 VITALS — BODY MASS INDEX: 30 KG/M2 | WEIGHT: 158 LBS | SYSTOLIC BLOOD PRESSURE: 104 MMHG | DIASTOLIC BLOOD PRESSURE: 62 MMHG

## 2020-08-20 DIAGNOSIS — Z34.83 ENCOUNTER FOR SUPERVISION OF OTHER NORMAL PREGNANCY IN THIRD TRIMESTER: Primary | ICD-10-CM

## 2020-08-20 LAB
APPEARANCE: CLEAR
MULTISTIX LOT#: 1044 NUMERIC
PH, URINE: 6.5 (ref 4.5–8)
SPECIFIC GRAVITY: 1.01 (ref 1–1.03)
URINE-COLOR: YELLOW
UROBILINOGEN,SEMI-QN: 0.2 MG/DL (ref 0–1.9)

## 2020-08-20 PROCEDURE — 3074F SYST BP LT 130 MM HG: CPT | Performed by: OBSTETRICS & GYNECOLOGY

## 2020-08-20 PROCEDURE — 3078F DIAST BP <80 MM HG: CPT | Performed by: OBSTETRICS & GYNECOLOGY

## 2020-08-20 PROCEDURE — 81002 URINALYSIS NONAUTO W/O SCOPE: CPT | Performed by: OBSTETRICS & GYNECOLOGY

## 2020-08-20 PROCEDURE — 0502F SUBSEQUENT PRENATAL CARE: CPT | Performed by: OBSTETRICS & GYNECOLOGY

## 2020-08-20 NOTE — PROGRESS NOTES
Patient with Cholestasis. LFTs now normal and Bile Acids Pending. Patient with Complete Placenta Previa. No episodes of bleeding. A1 DM. BS Log WNL. To fax x 1 week to office. Has weekly NSTs on .   Patient scheduled for Primary  o

## 2020-08-20 NOTE — ADDENDUM NOTE
Encounter addended by: Anca Leon RN on: 8/19/2020 7:13 PM   Actions taken: Visit Navigator Flowsheet section accepted

## 2020-08-20 NOTE — TELEPHONE ENCOUNTER
Please schedule the following surgery:     Procedure:  Primary . Assist: (Y/N or none)  Need Assist.  Date:  Already scheduled with [facility] for Monday, 2020 when patient is 37 weeks. Dx:  Cholestasis, Complete Placenta Previa.   Pre-op appt: (Y/

## 2020-08-21 ENCOUNTER — TELEPHONE (OUTPATIENT)
Dept: OBGYN CLINIC | Facility: CLINIC | Age: 40
End: 2020-08-21

## 2020-08-21 DIAGNOSIS — Z34.03 ENCOUNTER FOR SUPERVISION OF NORMAL FIRST PREGNANCY IN THIRD TRIMESTER: Primary | ICD-10-CM

## 2020-08-21 LAB
BILE ACIDS, TOTAL: 6 UMOL/L
T PALLIDUM AB SER QL: NEGATIVE

## 2020-08-21 NOTE — TELEPHONE ENCOUNTER
Jordanian phone line  #[phone] used to translate phone call. Pt called and informed of results and recommendations. Pt voices understanding.  35 week labs placed in Epic.       ---- Message from DALLAS Mix sent at 8/20/2020  3:43 PM CDT ----

## 2020-08-21 NOTE — TELEPHONE ENCOUNTER
Pt states she has her csection scheduled 9/14 and states her insurance is requesting a PA.  Please advise

## 2020-08-26 ENCOUNTER — HOSPITAL ENCOUNTER (OUTPATIENT)
Dept: PERINATAL CARE | Facility: HOSPITAL | Age: 40
Discharge: HOME | End: 2020-08-26
Attending: OBSTETRICS & GYNECOLOGY
Payer: MEDICAID

## 2020-08-26 ENCOUNTER — LAB ENCOUNTER (OUTPATIENT)
Dept: LAB | Facility: REFERENCE LAB | Age: 40
End: 2020-08-26
Attending: OBSTETRICS & GYNECOLOGY
Payer: MEDICAID

## 2020-08-26 DIAGNOSIS — O09.519 ADVANCED MATERNAL AGE, PRIMIGRAVIDA, ANTEPARTUM: Primary | ICD-10-CM

## 2020-08-26 DIAGNOSIS — L29.9 PRURITUS OF PREGNANCY IN THIRD TRIMESTER: ICD-10-CM

## 2020-08-26 DIAGNOSIS — O24.410 GESTATIONAL DIABETES, DIET CONTROLLED: ICD-10-CM

## 2020-08-26 DIAGNOSIS — O99.713 PRURITUS OF PREGNANCY IN THIRD TRIMESTER: ICD-10-CM

## 2020-08-26 DIAGNOSIS — K83.1 CHOLESTASIS: ICD-10-CM

## 2020-08-26 DIAGNOSIS — O24.410 DIET CONTROLLED GESTATIONAL DIABETES MELLITUS (GDM) IN THIRD TRIMESTER: ICD-10-CM

## 2020-08-26 LAB
ALT SERPL-CCNC: 28 U/L (ref 13–56)
AST SERPL-CCNC: 23 U/L (ref 15–37)

## 2020-08-26 PROCEDURE — 36415 COLL VENOUS BLD VENIPUNCTURE: CPT

## 2020-08-26 PROCEDURE — 59025 FETAL NON-STRESS TEST: CPT | Performed by: OBSTETRICS & GYNECOLOGY

## 2020-08-26 PROCEDURE — 84460 ALANINE AMINO (ALT) (SGPT): CPT

## 2020-08-26 PROCEDURE — 84450 TRANSFERASE (AST) (SGOT): CPT

## 2020-08-26 NOTE — NST
Nonstress Test   Patient: Kelly Dennisdmont    Gestation: 34w2d    NST: ama gdm        Variability: Moderate           Accelerations: Yes           Decelerations: None            Baseline: 145 BPM           Uterine Irritability: No           Contractions: Not

## 2020-08-27 ENCOUNTER — PATIENT MESSAGE (OUTPATIENT)
Dept: OBGYN CLINIC | Facility: CLINIC | Age: 40
End: 2020-08-27

## 2020-08-28 ENCOUNTER — TELEPHONE (OUTPATIENT)
Dept: OBGYN CLINIC | Facility: CLINIC | Age: 40
End: 2020-08-28

## 2020-08-28 NOTE — TELEPHONE ENCOUNTER
Regarding: Visit Follow-up Question  Contact: [phone]  ----- Message from Xander Glynn MD sent at 8/28/2020 10:04 AM CDT -----       ----- Message from Kalina Perez to Veronica Goldmann, MD sent at 8/27/2020  2:17 PM -----   Kerry Sutton

## 2020-08-28 NOTE — TELEPHONE ENCOUNTER
[employer] message sent to pt.    ----- Message from Corby Jerez MD sent at 8/28/2020  9:57 AM CDT -----  Please inform by [employer], mail, or call of normal laboratory tests-- LFTs.

## 2020-08-28 NOTE — ADDENDUM NOTE
Encounter addended by: Sharyle Cabot, MD on: 8/28/2020 12:36 AM   Actions taken: Visit diagnoses modified, Charge Capture section accepted, Clinical Note Signed

## 2020-08-28 NOTE — TELEPHONE ENCOUNTER
Central African phone line  #[id number] used to translate phone call. Pt called and informed of results and recommendations. Pt voices understanding.

## 2020-09-02 ENCOUNTER — HOSPITAL ENCOUNTER (OUTPATIENT)
Dept: PERINATAL CARE | Facility: HOSPITAL | Age: 40
Discharge: HOME | End: 2020-09-02
Attending: OBSTETRICS & GYNECOLOGY
Payer: MEDICAID

## 2020-09-02 ENCOUNTER — LAB ENCOUNTER (OUTPATIENT)
Dept: LAB | Facility: REFERENCE LAB | Age: 40
End: 2020-09-02
Attending: NURSE PRACTITIONER
Payer: MEDICAID

## 2020-09-02 DIAGNOSIS — O24.410 DIET CONTROLLED GESTATIONAL DIABETES MELLITUS (GDM) IN THIRD TRIMESTER: ICD-10-CM

## 2020-09-02 DIAGNOSIS — O09.519 ADVANCED MATERNAL AGE, PRIMIGRAVIDA, ANTEPARTUM: Primary | ICD-10-CM

## 2020-09-02 DIAGNOSIS — K83.1 CHOLESTASIS: ICD-10-CM

## 2020-09-02 DIAGNOSIS — O24.410 GESTATIONAL DIABETES, DIET CONTROLLED: ICD-10-CM

## 2020-09-02 LAB
ALT SERPL-CCNC: 28 U/L (ref 13–56)
AST SERPL-CCNC: 22 U/L (ref 15–37)

## 2020-09-02 PROCEDURE — 36415 COLL VENOUS BLD VENIPUNCTURE: CPT

## 2020-09-02 PROCEDURE — 84460 ALANINE AMINO (ALT) (SGPT): CPT

## 2020-09-02 PROCEDURE — 84450 TRANSFERASE (AST) (SGOT): CPT

## 2020-09-02 PROCEDURE — 59025 FETAL NON-STRESS TEST: CPT | Performed by: OBSTETRICS & GYNECOLOGY

## 2020-09-02 NOTE — NST
Nonstress Test   Patient: Avelino Pickard    Gestation: 35w2d    NST: ama gdm       Variability: Moderate           Accelerations: Yes           Decelerations: None            Baseline: 140 BPM           Uterine Irritability: No           Contractions: Not p

## 2020-09-03 ENCOUNTER — ROUTINE PRENATAL (OUTPATIENT)
Dept: OBGYN CLINIC | Facility: CLINIC | Age: 40
End: 2020-09-03
Payer: MEDICAID

## 2020-09-03 VITALS — BODY MASS INDEX: 30 KG/M2 | WEIGHT: 161.19 LBS | DIASTOLIC BLOOD PRESSURE: 68 MMHG | SYSTOLIC BLOOD PRESSURE: 108 MMHG

## 2020-09-03 DIAGNOSIS — Z34.83 ENCOUNTER FOR SUPERVISION OF OTHER NORMAL PREGNANCY IN THIRD TRIMESTER: Primary | ICD-10-CM

## 2020-09-03 LAB
MULTISTIX LOT#: 1044 NUMERIC
PH, URINE: 6.5 (ref 4.5–8)
SPECIFIC GRAVITY: 1.01 (ref 1–1.03)
URINE-COLOR: YELLOW
UROBILINOGEN,SEMI-QN: 0.2 MG/DL (ref 0–1.9)

## 2020-09-03 PROCEDURE — 81002 URINALYSIS NONAUTO W/O SCOPE: CPT | Performed by: OBSTETRICS & GYNECOLOGY

## 2020-09-03 PROCEDURE — 0502F SUBSEQUENT PRENATAL CARE: CPT | Performed by: OBSTETRICS & GYNECOLOGY

## 2020-09-03 PROCEDURE — 3074F SYST BP LT 130 MM HG: CPT | Performed by: OBSTETRICS & GYNECOLOGY

## 2020-09-03 PROCEDURE — 3078F DIAST BP <80 MM HG: CPT | Performed by: OBSTETRICS & GYNECOLOGY

## 2020-09-03 NOTE — ADDENDUM NOTE
Encounter addended by: Alysa Schreiber MD on: 9/3/2020 6:11 AM   Actions taken: Visit diagnoses modified, Charge Capture section accepted, Clinical Note Signed

## 2020-09-03 NOTE — PROGRESS NOTES
No complaints. Still taking ursodiol as well as Benadryl at bedtime. Reports good fetal movements. Reactive NST yesterday. Is scheduled for primary  section for placenta previa at 37 weeks on .   Blood sugars within normal limits but

## 2020-09-04 ENCOUNTER — LAB ENCOUNTER (OUTPATIENT)
Dept: LAB | Facility: HOSPITAL | Age: 40
End: 2020-09-04
Attending: OBSTETRICS & GYNECOLOGY
Payer: MEDICAID

## 2020-09-04 DIAGNOSIS — Z34.83 ENCOUNTER FOR SUPERVISION OF OTHER NORMAL PREGNANCY IN THIRD TRIMESTER: ICD-10-CM

## 2020-09-04 LAB
BASOPHILS # BLD AUTO: 0.01 X10(3) UL (ref 0–0.2)
BASOPHILS NFR BLD AUTO: 0.2 %
DEPRECATED RDW RBC AUTO: 42 FL (ref 35.1–46.3)
EOSINOPHIL # BLD AUTO: 0.01 X10(3) UL (ref 0–0.7)
EOSINOPHIL NFR BLD AUTO: 0.2 %
ERYTHROCYTE [DISTWIDTH] IN BLOOD BY AUTOMATED COUNT: 13.2 % (ref 11–15)
HCT VFR BLD AUTO: 37.7 % (ref 35–48)
HGB BLD-MCNC: 13 G/DL (ref 12–16)
IMM GRANULOCYTES # BLD AUTO: 0.02 X10(3) UL (ref 0–1)
IMM GRANULOCYTES NFR BLD: 0.4 %
LYMPHOCYTES # BLD AUTO: 1.43 X10(3) UL (ref 1–4)
LYMPHOCYTES NFR BLD AUTO: 25.4 %
MCH RBC QN AUTO: 30.1 PG (ref 26–34)
MCHC RBC AUTO-ENTMCNC: 34.5 G/DL (ref 31–37)
MCV RBC AUTO: 87.3 FL (ref 80–100)
MONOCYTES # BLD AUTO: 0.4 X10(3) UL (ref 0.1–1)
MONOCYTES NFR BLD AUTO: 7.1 %
NEUTROPHILS # BLD AUTO: 3.75 X10 (3) UL (ref 1.5–7.7)
NEUTROPHILS # BLD AUTO: 3.75 X10(3) UL (ref 1.5–7.7)
NEUTROPHILS NFR BLD AUTO: 66.7 %
PLATELET # BLD AUTO: 220 10(3)UL (ref 150–450)
RBC # BLD AUTO: 4.32 X10(6)UL (ref 3.8–5.3)
WBC # BLD AUTO: 5.6 X10(3) UL (ref 4–11)

## 2020-09-04 PROCEDURE — 85025 COMPLETE CBC W/AUTO DIFF WBC: CPT

## 2020-09-04 PROCEDURE — 36415 COLL VENOUS BLD VENIPUNCTURE: CPT

## 2020-09-04 PROCEDURE — 86780 TREPONEMA PALLIDUM: CPT

## 2020-09-04 PROCEDURE — 87389 HIV-1 AG W/HIV-1&-2 AB AG IA: CPT

## 2020-09-07 LAB — T PALLIDUM AB SER QL: NEGATIVE

## 2020-09-09 ENCOUNTER — LAB ENCOUNTER (OUTPATIENT)
Dept: LAB | Facility: REFERENCE LAB | Age: 40
End: 2020-09-09
Attending: OBSTETRICS & GYNECOLOGY
Payer: MEDICAID

## 2020-09-09 ENCOUNTER — HOSPITAL ENCOUNTER (OUTPATIENT)
Dept: PERINATAL CARE | Facility: HOSPITAL | Age: 40
Discharge: HOME | End: 2020-09-09
Attending: OBSTETRICS & GYNECOLOGY
Payer: MEDICAID

## 2020-09-09 VITALS — SYSTOLIC BLOOD PRESSURE: 135 MMHG | HEART RATE: 80 BPM | DIASTOLIC BLOOD PRESSURE: 82 MMHG

## 2020-09-09 DIAGNOSIS — O09.519 ADVANCED MATERNAL AGE, PRIMIGRAVIDA, ANTEPARTUM: ICD-10-CM

## 2020-09-09 DIAGNOSIS — K83.1 CHOLESTASIS: ICD-10-CM

## 2020-09-09 DIAGNOSIS — O09.519 ADVANCED MATERNAL AGE, PRIMIGRAVIDA, ANTEPARTUM: Primary | ICD-10-CM

## 2020-09-09 DIAGNOSIS — O44.00 PLACENTA PREVIA WITHOUT HEMORRHAGE, ANTEPARTUM: ICD-10-CM

## 2020-09-09 DIAGNOSIS — O24.410 DIET CONTROLLED GESTATIONAL DIABETES MELLITUS (GDM) IN THIRD TRIMESTER: ICD-10-CM

## 2020-09-09 LAB
ALT SERPL-CCNC: 27 U/L (ref 13–56)
AST SERPL-CCNC: 15 U/L (ref 15–37)

## 2020-09-09 PROCEDURE — 84450 TRANSFERASE (AST) (SGOT): CPT

## 2020-09-09 PROCEDURE — 76816 OB US FOLLOW-UP PER FETUS: CPT | Performed by: OBSTETRICS & GYNECOLOGY

## 2020-09-09 PROCEDURE — 84460 ALANINE AMINO (ALT) (SGPT): CPT

## 2020-09-09 PROCEDURE — 36415 COLL VENOUS BLD VENIPUNCTURE: CPT

## 2020-09-09 NOTE — PROGRESS NOTES
/82   Pulse 80   LMP 12/30/2019 (Exact Date)       OB ULTRASOUND REPORT   See imaging tab for complete ultrasound report or in PACS      Single IUP in cephalic presentation. Placenta is posterior previa. A 3 vessel cord is noted.   Cardiac acti

## 2020-09-11 ENCOUNTER — TELEPHONE (OUTPATIENT)
Dept: OBGYN UNIT | Facility: HOSPITAL | Age: 40
End: 2020-09-11

## 2020-09-11 ENCOUNTER — ROUTINE PRENATAL (OUTPATIENT)
Dept: OBGYN CLINIC | Facility: CLINIC | Age: 40
End: 2020-09-11
Payer: MEDICAID

## 2020-09-11 ENCOUNTER — APPOINTMENT (OUTPATIENT)
Dept: LAB | Facility: HOSPITAL | Age: 40
End: 2020-09-11
Attending: OBSTETRICS & GYNECOLOGY
Payer: MEDICAID

## 2020-09-11 VITALS — SYSTOLIC BLOOD PRESSURE: 116 MMHG | DIASTOLIC BLOOD PRESSURE: 70 MMHG | BODY MASS INDEX: 31 KG/M2 | WEIGHT: 163.19 LBS

## 2020-09-11 DIAGNOSIS — Z01.818 PRE-OP TESTING: ICD-10-CM

## 2020-09-11 DIAGNOSIS — Z34.83 ENCOUNTER FOR SUPERVISION OF OTHER NORMAL PREGNANCY IN THIRD TRIMESTER: Primary | ICD-10-CM

## 2020-09-11 LAB
APPEARANCE: CLEAR
MULTISTIX LOT#: 1044 NUMERIC
PH, URINE: 6 (ref 4.5–8)
SPECIFIC GRAVITY: 1.02 (ref 1–1.03)
URINE-COLOR: YELLOW
UROBILINOGEN,SEMI-QN: 1 MG/DL (ref 0–1.9)

## 2020-09-11 PROCEDURE — 3074F SYST BP LT 130 MM HG: CPT | Performed by: OBSTETRICS & GYNECOLOGY

## 2020-09-11 PROCEDURE — 3078F DIAST BP <80 MM HG: CPT | Performed by: OBSTETRICS & GYNECOLOGY

## 2020-09-11 PROCEDURE — 81002 URINALYSIS NONAUTO W/O SCOPE: CPT | Performed by: OBSTETRICS & GYNECOLOGY

## 2020-09-11 PROCEDURE — 0502F SUBSEQUENT PRENATAL CARE: CPT | Performed by: OBSTETRICS & GYNECOLOGY

## 2020-09-12 LAB — SARS-COV-2 RNA RESP QL NAA+PROBE: NOT DETECTED

## 2020-09-12 NOTE — PROGRESS NOTES
Pt with complete posterior placenta previa, has already been scheduled by another provider for primary c/s at 37 weeks. Pt counseled, all questions answered.

## 2020-09-14 ENCOUNTER — ANESTHESIA EVENT (OUTPATIENT)
Dept: OBGYN UNIT | Facility: HOSPITAL | Age: 40
End: 2020-09-14
Payer: MEDICAID

## 2020-09-14 ENCOUNTER — ANESTHESIA (OUTPATIENT)
Dept: OBGYN UNIT | Facility: HOSPITAL | Age: 40
End: 2020-09-14
Payer: MEDICAID

## 2020-09-14 ENCOUNTER — HOSPITAL ENCOUNTER (INPATIENT)
Facility: HOSPITAL | Age: 40
LOS: 4 days | Discharge: HOME | End: 2020-09-18
Attending: OBSTETRICS & GYNECOLOGY | Admitting: OBSTETRICS & GYNECOLOGY
Payer: MEDICAID

## 2020-09-14 ENCOUNTER — TELEPHONE (OUTPATIENT)
Dept: OBGYN CLINIC | Facility: CLINIC | Age: 40
End: 2020-09-14

## 2020-09-14 PROBLEM — Z34.90 PREGNANCY: Status: ACTIVE | Noted: 2020-09-14

## 2020-09-14 LAB
ANTIBODY SCREEN: NEGATIVE
BASOPHILS # BLD AUTO: 0.02 X10(3) UL (ref 0–0.2)
BASOPHILS NFR BLD AUTO: 0.4 %
DEPRECATED RDW RBC AUTO: 41.6 FL (ref 35.1–46.3)
EOSINOPHIL # BLD AUTO: 0.01 X10(3) UL (ref 0–0.7)
EOSINOPHIL NFR BLD AUTO: 0.2 %
ERYTHROCYTE [DISTWIDTH] IN BLOOD BY AUTOMATED COUNT: 13.5 % (ref 11–15)
GLUCOSE BLDC GLUCOMTR-MCNC: 78 MG/DL (ref 70–99)
HCT VFR BLD AUTO: 38 % (ref 35–48)
HGB BLD-MCNC: 13.6 G/DL (ref 12–16)
IMM GRANULOCYTES # BLD AUTO: 0.02 X10(3) UL (ref 0–1)
IMM GRANULOCYTES NFR BLD: 0.4 %
LYMPHOCYTES # BLD AUTO: 1.64 X10(3) UL (ref 1–4)
LYMPHOCYTES NFR BLD AUTO: 30.4 %
MCH RBC QN AUTO: 30.9 PG (ref 26–34)
MCHC RBC AUTO-ENTMCNC: 35.8 G/DL (ref 31–37)
MCV RBC AUTO: 86.4 FL (ref 80–100)
MONOCYTES # BLD AUTO: 0.36 X10(3) UL (ref 0.1–1)
MONOCYTES NFR BLD AUTO: 6.7 %
NEUTROPHILS # BLD AUTO: 3.35 X10 (3) UL (ref 1.5–7.7)
NEUTROPHILS # BLD AUTO: 3.35 X10(3) UL (ref 1.5–7.7)
NEUTROPHILS NFR BLD AUTO: 61.9 %
PLATELET # BLD AUTO: 216 10(3)UL (ref 150–450)
RBC # BLD AUTO: 4.4 X10(6)UL (ref 3.8–5.3)
RH BLOOD TYPE: POSITIVE
WBC # BLD AUTO: 5.4 X10(3) UL (ref 4–11)

## 2020-09-14 PROCEDURE — 59514 CESAREAN DELIVERY ONLY: CPT | Performed by: OBSTETRICS & GYNECOLOGY

## 2020-09-14 RX ORDER — ENOXAPARIN SODIUM 100 MG/ML
40 INJECTION SUBCUTANEOUS EVERY 24 HOURS
Status: DISCONTINUED | OUTPATIENT
Start: 2020-09-14 | End: 2020-09-18

## 2020-09-14 RX ORDER — ONDANSETRON 2 MG/ML
4 INJECTION INTRAMUSCULAR; INTRAVENOUS EVERY 6 HOURS PRN
Status: DISCONTINUED | OUTPATIENT
Start: 2020-09-14 | End: 2020-09-14

## 2020-09-14 RX ORDER — CHOLECALCIFEROL (VITAMIN D3) 25 MCG
1 TABLET,CHEWABLE ORAL DAILY
Status: DISCONTINUED | OUTPATIENT
Start: 2020-09-14 | End: 2020-09-18

## 2020-09-14 RX ORDER — NALOXONE HYDROCHLORIDE 0.4 MG/ML
0.08 INJECTION, SOLUTION INTRAMUSCULAR; INTRAVENOUS; SUBCUTANEOUS
Status: ACTIVE | OUTPATIENT
Start: 2020-09-14 | End: 2020-09-15

## 2020-09-14 RX ORDER — SIMETHICONE 80 MG
80 TABLET,CHEWABLE ORAL 3 TIMES DAILY PRN
Status: DISCONTINUED | OUTPATIENT
Start: 2020-09-14 | End: 2020-09-18

## 2020-09-14 RX ORDER — DIPHENHYDRAMINE HCL 25 MG
25 CAPSULE ORAL EVERY 4 HOURS PRN
Status: ACTIVE | OUTPATIENT
Start: 2020-09-14 | End: 2020-09-15

## 2020-09-14 RX ORDER — PHENYLEPHRINE HCL 10 MG/ML
VIAL (ML) INJECTION AS NEEDED
Status: DISCONTINUED | OUTPATIENT
Start: 2020-09-14 | End: 2020-09-14 | Stop reason: SURG

## 2020-09-14 RX ORDER — SODIUM PHOSPHATE, DIBASIC AND SODIUM PHOSPHATE, MONOBASIC 7; 19 G/133ML; G/133ML
1 ENEMA RECTAL ONCE AS NEEDED
Status: DISCONTINUED | OUTPATIENT
Start: 2020-09-14 | End: 2020-09-18

## 2020-09-14 RX ORDER — LIDOCAINE HYDROCHLORIDE 10 MG/ML
INJECTION, SOLUTION INFILTRATION; PERINEURAL
Status: COMPLETED | OUTPATIENT
Start: 2020-09-14 | End: 2020-09-14

## 2020-09-14 RX ORDER — BUPIVACAINE HYDROCHLORIDE 7.5 MG/ML
INJECTION, SOLUTION INTRASPINAL
Status: COMPLETED | OUTPATIENT
Start: 2020-09-14 | End: 2020-09-14

## 2020-09-14 RX ORDER — POLYETHYLENE GLYCOL 3350 17 G/17G
17 POWDER, FOR SOLUTION ORAL DAILY PRN
Status: DISCONTINUED | OUTPATIENT
Start: 2020-09-14 | End: 2020-09-18

## 2020-09-14 RX ORDER — AMMONIA INHALANTS 0.04 G/.3ML
0.3 INHALANT RESPIRATORY (INHALATION) AS NEEDED
Status: DISCONTINUED | OUTPATIENT
Start: 2020-09-14 | End: 2020-09-18

## 2020-09-14 RX ORDER — SODIUM CHLORIDE, SODIUM LACTATE, POTASSIUM CHLORIDE, CALCIUM CHLORIDE 600; 310; 30; 20 MG/100ML; MG/100ML; MG/100ML; MG/100ML
INJECTION, SOLUTION INTRAVENOUS CONTINUOUS
Status: DISCONTINUED | OUTPATIENT
Start: 2020-09-14 | End: 2020-09-14

## 2020-09-14 RX ORDER — HYDROMORPHONE HYDROCHLORIDE 1 MG/ML
0.4 INJECTION, SOLUTION INTRAMUSCULAR; INTRAVENOUS; SUBCUTANEOUS
Status: ACTIVE | OUTPATIENT
Start: 2020-09-14 | End: 2020-09-15

## 2020-09-14 RX ORDER — HYDROMORPHONE HYDROCHLORIDE 1 MG/ML
0.6 INJECTION, SOLUTION INTRAMUSCULAR; INTRAVENOUS; SUBCUTANEOUS
Status: ACTIVE | OUTPATIENT
Start: 2020-09-14 | End: 2020-09-15

## 2020-09-14 RX ORDER — HYDROCODONE BITARTRATE AND ACETAMINOPHEN 5; 325 MG/1; MG/1
1 TABLET ORAL EVERY 6 HOURS PRN
Status: DISCONTINUED | OUTPATIENT
Start: 2020-09-14 | End: 2020-09-18

## 2020-09-14 RX ORDER — TRISODIUM CITRATE DIHYDRATE AND CITRIC ACID MONOHYDRATE 500; 334 MG/5ML; MG/5ML
30 SOLUTION ORAL ONCE
Status: COMPLETED | OUTPATIENT
Start: 2020-09-14 | End: 2020-09-14

## 2020-09-14 RX ORDER — HYDROCODONE BITARTRATE AND ACETAMINOPHEN 7.5; 325 MG/1; MG/1
2 TABLET ORAL EVERY 6 HOURS PRN
Status: ACTIVE | OUTPATIENT
Start: 2020-09-14 | End: 2020-09-15

## 2020-09-14 RX ORDER — DOCUSATE SODIUM 100 MG/1
100 CAPSULE, LIQUID FILLED ORAL
Status: DISCONTINUED | OUTPATIENT
Start: 2020-09-14 | End: 2020-09-18

## 2020-09-14 RX ORDER — FAMOTIDINE 20 MG/1
20 TABLET ORAL ONCE
Status: COMPLETED | OUTPATIENT
Start: 2020-09-14 | End: 2020-09-14

## 2020-09-14 RX ORDER — HYDROCODONE BITARTRATE AND ACETAMINOPHEN 5; 325 MG/1; MG/1
2 TABLET ORAL EVERY 6 HOURS PRN
Status: DISCONTINUED | OUTPATIENT
Start: 2020-09-14 | End: 2020-09-18

## 2020-09-14 RX ORDER — ACETAMINOPHEN 325 MG/1
650 TABLET ORAL EVERY 6 HOURS PRN
Status: ACTIVE | OUTPATIENT
Start: 2020-09-14 | End: 2020-09-15

## 2020-09-14 RX ORDER — ACETAMINOPHEN 325 MG/1
650 TABLET ORAL EVERY 6 HOURS PRN
Status: DISCONTINUED | OUTPATIENT
Start: 2020-09-14 | End: 2020-09-18

## 2020-09-14 RX ORDER — HYDROCODONE BITARTRATE AND ACETAMINOPHEN 7.5; 325 MG/1; MG/1
1 TABLET ORAL EVERY 6 HOURS PRN
Status: DISPENSED | OUTPATIENT
Start: 2020-09-14 | End: 2020-09-15

## 2020-09-14 RX ORDER — MORPHINE SULFATE 1 MG/ML
INJECTION, SOLUTION EPIDURAL; INTRATHECAL; INTRAVENOUS
Status: COMPLETED | OUTPATIENT
Start: 2020-09-14 | End: 2020-09-14

## 2020-09-14 RX ORDER — EPHEDRINE SULFATE 50 MG/ML
INJECTION, SOLUTION INTRAVENOUS AS NEEDED
Status: DISCONTINUED | OUTPATIENT
Start: 2020-09-14 | End: 2020-09-14 | Stop reason: SURG

## 2020-09-14 RX ORDER — HALOPERIDOL 5 MG/ML
0.5 INJECTION INTRAMUSCULAR ONCE AS NEEDED
Status: ACTIVE | OUTPATIENT
Start: 2020-09-14 | End: 2020-09-14

## 2020-09-14 RX ORDER — FAMOTIDINE 10 MG/ML
20 INJECTION, SOLUTION INTRAVENOUS ONCE
Status: COMPLETED | OUTPATIENT
Start: 2020-09-14 | End: 2020-09-14

## 2020-09-14 RX ORDER — ONDANSETRON 2 MG/ML
4 INJECTION INTRAMUSCULAR; INTRAVENOUS ONCE AS NEEDED
Status: ACTIVE | OUTPATIENT
Start: 2020-09-14 | End: 2020-09-14

## 2020-09-14 RX ORDER — DIPHENHYDRAMINE HYDROCHLORIDE 50 MG/ML
12.5 INJECTION INTRAMUSCULAR; INTRAVENOUS EVERY 4 HOURS PRN
Status: ACTIVE | OUTPATIENT
Start: 2020-09-14 | End: 2020-09-15

## 2020-09-14 RX ORDER — DEXAMETHASONE SODIUM PHOSPHATE 4 MG/ML
VIAL (ML) INJECTION AS NEEDED
Status: DISCONTINUED | OUTPATIENT
Start: 2020-09-14 | End: 2020-09-14 | Stop reason: SURG

## 2020-09-14 RX ORDER — METOCLOPRAMIDE 10 MG/1
10 TABLET ORAL ONCE
Status: COMPLETED | OUTPATIENT
Start: 2020-09-14 | End: 2020-09-14

## 2020-09-14 RX ORDER — BISACODYL 10 MG
10 SUPPOSITORY, RECTAL RECTAL
Status: DISCONTINUED | OUTPATIENT
Start: 2020-09-14 | End: 2020-09-18

## 2020-09-14 RX ORDER — METOCLOPRAMIDE HYDROCHLORIDE 5 MG/ML
10 INJECTION INTRAMUSCULAR; INTRAVENOUS ONCE
Status: COMPLETED | OUTPATIENT
Start: 2020-09-14 | End: 2020-09-14

## 2020-09-14 RX ORDER — ONDANSETRON 2 MG/ML
4 INJECTION INTRAMUSCULAR; INTRAVENOUS EVERY 6 HOURS PRN
Status: DISCONTINUED | OUTPATIENT
Start: 2020-09-14 | End: 2020-09-18

## 2020-09-14 RX ORDER — CEFAZOLIN SODIUM/WATER 2 G/20 ML
2 SYRINGE (ML) INTRAVENOUS ONCE
Status: COMPLETED | OUTPATIENT
Start: 2020-09-14 | End: 2020-09-14

## 2020-09-14 RX ORDER — DEXTROSE, SODIUM CHLORIDE, SODIUM LACTATE, POTASSIUM CHLORIDE, AND CALCIUM CHLORIDE 5; .6; .31; .03; .02 G/100ML; G/100ML; G/100ML; G/100ML; G/100ML
INJECTION, SOLUTION INTRAVENOUS CONTINUOUS
Status: DISCONTINUED | OUTPATIENT
Start: 2020-09-14 | End: 2020-09-18

## 2020-09-14 RX ORDER — PROCHLORPERAZINE EDISYLATE 5 MG/ML
5 INJECTION INTRAMUSCULAR; INTRAVENOUS ONCE AS NEEDED
Status: ACTIVE | OUTPATIENT
Start: 2020-09-14 | End: 2020-09-14

## 2020-09-14 RX ADMIN — BUPIVACAINE HYDROCHLORIDE 1.6 ML: 7.5 INJECTION, SOLUTION INTRASPINAL at 14:01:00

## 2020-09-14 RX ADMIN — ONDANSETRON 4 MG: 2 INJECTION INTRAMUSCULAR; INTRAVENOUS at 14:14:00

## 2020-09-14 RX ADMIN — LIDOCAINE HYDROCHLORIDE 5 ML: 10 INJECTION, SOLUTION INFILTRATION; PERINEURAL at 14:01:00

## 2020-09-14 RX ADMIN — MORPHINE SULFATE 0.2 MG: 1 INJECTION, SOLUTION EPIDURAL; INTRATHECAL; INTRAVENOUS at 14:01:00

## 2020-09-14 RX ADMIN — PHENYLEPHRINE HCL 100 MCG: 10 MG/ML VIAL (ML) INJECTION at 13:56:00

## 2020-09-14 RX ADMIN — EPHEDRINE SULFATE 5 MG: 50 INJECTION, SOLUTION INTRAVENOUS at 13:58:00

## 2020-09-14 RX ADMIN — CEFAZOLIN SODIUM/WATER 2 G: 2 G/20 ML SYRINGE (ML) INTRAVENOUS at 14:01:00

## 2020-09-14 RX ADMIN — SODIUM CHLORIDE, SODIUM LACTATE, POTASSIUM CHLORIDE, CALCIUM CHLORIDE: 600; 310; 30; 20 INJECTION, SOLUTION INTRAVENOUS at 13:41:00

## 2020-09-14 RX ADMIN — DEXAMETHASONE SODIUM PHOSPHATE 4 MG: 4 MG/ML VIAL (ML) INJECTION at 14:14:00

## 2020-09-14 NOTE — ANESTHESIA POSTPROCEDURE EVALUATION
Patient: Ines Lackey    Procedure Summary     Date:  20 Room / Location:  [facility] L+D OR  [facility] L+D OR    Anesthesia Start:   Anesthesia Stop:  5239    Procedure:   SECTION (N/A Abdomen) Diagnosis:  (Complete previa, cholestasis)    Surge

## 2020-09-14 NOTE — H&P
[address] Patient Status:  Surgery Admit - Inpt    1980 MRN [id number]   Location [facility] Attending Juni Viveros, Silvestre De La Cruz MD   Hosp Day # 0 PCP Jenn Sandy bilaterally  Cardiac: regular rate and rhythm, S1, S2 normal, no murmur, click, rub or gallop  Abdomen: FHT present  Fetal Surveillance:  Reactive nst, cat 1    Sterile vaginal exam: deferred.      Results:     Lab Results   Component Value Date    TREPONEM answered, all appropriate consents will be signed at the Hospital. Admission is planned for today. Continue present management. Tracy THOMPSON  [facility]  9/14/2020  7:46 AM

## 2020-09-14 NOTE — L&D DELIVERY NOTE
[facility] - [facility]     Section Delivery Note    Attila Ignacio Patient Status:  Inpatient    1980 MRN [id number]   Location [facility] Attending Raffi Christian, Jolene Malin MD   Hosp Day # 0 PCP Jose Mcdonough

## 2020-09-14 NOTE — ANESTHESIA PREPROCEDURE EVALUATION
Anesthesia PreOp Note    HPI:     Jorge Caal is a 35year old female who presents for preoperative consultation requested by:  Ember Dueñas MD    Date of Surgery: 2020    Procedure(s):   SECTION  Indication: Complete previa, cholesta insurance., Disp: 100 strip, Rfl: prn, Taking      lactated ringers infusion, , Intravenous, Continuous, Junito Castaneda MD  lactated ringers IV bolus 1,000 mL, 1,000 mL, Intravenous, Once, Keri Vernon MD  Sod Citrate-Citric Acid [facility]) so Glasses of wine per week        Frequency: Monthly or less        Drinks per session: 1 or 2        Binge frequency: Less than monthly        Comment: socially      Drug use: Never      Sexual activity: Not on file    Lifestyle      Physical activity: Anesthesia Plan:   ASA:  2  Plan:   Spinal  Informed Consent Plan and Risks Discussed With:  Patient      I have informed Sepideh Sleeper and/or legal guardian or family member of the nature of the anesthetic plan, benefits, risks including possible den

## 2020-09-14 NOTE — TELEPHONE ENCOUNTER
Time of  is for today at 1:30 pt should be at location at least a 1 hour prior. Per GERMAN okay to leave a detailed message. Left detailed message informing of time. Pt to call back if she has any other questions.

## 2020-09-14 NOTE — ANESTHESIA PROCEDURE NOTES
Spinal Block  Date/Time: 9/14/2020 2:01 PM  Performed by: Davian Partida MD  Authorized by: Davian Partida MD       General Information and Staff    Start Time:  9/14/2020 2:00 PM  End Time:  9/14/2020 2:00 PM  Anesthesiologist:  Davian Partida MD  [facility]

## 2020-09-15 LAB
BASOPHILS # BLD AUTO: 0.02 X10(3) UL (ref 0–0.2)
BASOPHILS NFR BLD AUTO: 0.2 %
DEPRECATED RDW RBC AUTO: 42.9 FL (ref 35.1–46.3)
EOSINOPHIL # BLD AUTO: 0.01 X10(3) UL (ref 0–0.7)
EOSINOPHIL NFR BLD AUTO: 0.1 %
ERYTHROCYTE [DISTWIDTH] IN BLOOD BY AUTOMATED COUNT: 13.9 % (ref 11–15)
GLUCOSE BLDC GLUCOMTR-MCNC: 93 MG/DL (ref 70–99)
HCT VFR BLD AUTO: 30.3 % (ref 35–48)
HGB BLD-MCNC: 10.8 G/DL (ref 12–16)
IMM GRANULOCYTES # BLD AUTO: 0.01 X10(3) UL (ref 0–1)
IMM GRANULOCYTES NFR BLD: 0.1 %
LYMPHOCYTES # BLD AUTO: 1.5 X10(3) UL (ref 1–4)
LYMPHOCYTES NFR BLD AUTO: 15.4 %
MCH RBC QN AUTO: 30.9 PG (ref 26–34)
MCHC RBC AUTO-ENTMCNC: 35.6 G/DL (ref 31–37)
MCV RBC AUTO: 86.6 FL (ref 80–100)
MONOCYTES # BLD AUTO: 0.94 X10(3) UL (ref 0.1–1)
MONOCYTES NFR BLD AUTO: 9.6 %
NEUTROPHILS # BLD AUTO: 7.28 X10 (3) UL (ref 1.5–7.7)
NEUTROPHILS # BLD AUTO: 7.28 X10(3) UL (ref 1.5–7.7)
NEUTROPHILS NFR BLD AUTO: 74.6 %
PLATELET # BLD AUTO: 191 10(3)UL (ref 150–450)
RBC # BLD AUTO: 3.5 X10(6)UL (ref 3.8–5.3)
WBC # BLD AUTO: 9.8 X10(3) UL (ref 4–11)

## 2020-09-15 RX ORDER — MELATONIN
325
Status: DISCONTINUED | OUTPATIENT
Start: 2020-09-16 | End: 2020-09-18

## 2020-09-15 NOTE — PLAN OF CARE
Problem: Patient Centered Care  Goal: Patient preferences are identified and integrated in the patient's plan of care  Description  Interventions:  - What would you like us to know as we care for you?   - Provide timely, complete, and accurate informatio breast  Description  INTERVENTIONS:  - Initiate breast feeding within first hour after birth. - Monitor effectiveness of current breast feeding efforts.   - Assess support systems available to mother/family.  - Identify cultural beliefs/practices regardin measures. Outcome: Progressing  Goal: Appropriate maternal -  bonding  Description  INTERVENTIONS:  - Assess caregiver- interactions. - Assess caregiver's emotional status and coping mechanisms.   - Encourage caregiver to participate in newb as needed  - Establish a toileting routine/schedule  - Consider collaborating with pharmacy to review patient's medication profile  Outcome: Progressing  Goal: Maintains adequate nutritional intake (undernourished)  Description  INTERVENTIONS:  - Monitor p

## 2020-09-15 NOTE — PROGRESS NOTES
[facility]   Acute Pain Rounds Note  9/15/2020    Patient name: Jorge Caal 35year old female  : 1980  MRN: [id number]    Diagnosis: [unfilled]    S/P: C Section    Pain modality: Duramorph    Current hospital day: Hospital Day: 2

## 2020-09-15 NOTE — PROGRESS NOTES
POD 1    Pt feels well. No c/o. Alert and oriented, nad  abd soft, nt, dressing c/d/i  Ext nt    Hb stable    A/p POD 1  Pt feels well. accuchecks ordered. Hb stable. Fe rx ordered.

## 2020-09-15 NOTE — PAYOR COMM NOTE
--------------  ADMISSION REVIEW     Payor: Federico Barker #:  [id number]  Authorization Number: [id number]    Admit date: 9/14/20  Admit time: 0       History & Physical    Brian Eid is a 35year old  female,    URINE OUTPUT:  250 mL clear urine.       COMPLICATIONS:  None.      CONDITION:  Patient tolerated the procedure well, was taken to PAR in good condition.     INDICATIONS:  The patient is a 28-year-old female at 42 weeks' gestational age noted with pos Azul Salcedo MD      dextrose 5 % /lactated ringers infusion     Date Action Dose Route User    9/15/2020 0323 New Bag (none) Intravenous Fco Webster RN      docusate sodium (COLACE) cap 100 mg     Date Action Dose Route User    9/14/2020 2312 Given 100 mg O User    9/14/2020 1356 Given 100 mcg Intravenous Rambo Parada MD      Sod Citrate-Citric Acid [facility]) solution 30 mL     Date Action Dose Route User    9/14/2020 1313 Given 30 mL Oral Saulo Ramirez RN          REVIEWER COMMENTS:     OTHER:

## 2020-09-15 NOTE — OPERATIVE REPORT
[facility]    PATIENT'S NAME: Love Cortes   ATTENDING PHYSICIAN: Myke Leslie MD   OPERATING PHYSICIAN: Joycelyn Leslie MD   PATIENT ACCOUNT#:   [de-identified]    LOCATION:  [address] [place] [address] #:   [id number]       DATE OF BIR Interceed on the raw surface and good hemostasis noted. 4.   Ovaries, uterus, and tubes otherwise grossly normal.     OPERATIVE TECHNIQUE:  The patient was taken to the operating room and after placement of spinal anesthesia by Dr. Cachorro Castillo.   The patient areas with a small amount of oozing. Compression was performed at this area to alleviate this. Sarmad and Interceed were then placed and good hemostasis was noted. All counts were correct.   The uterus, ovaries, and tubes were placed intraperitoneally an

## 2020-09-15 NOTE — LACTATION NOTE
LACTATION NOTE - MOTHER      Evaluation Type: Inpatient    Problems identified  Problems identified: Knowledge deficit;Milk supply WNL    Maternal history  Maternal history: AMA; Gestational diabetes;Caesarean section  Other/comment: C/S for complete previa

## 2020-09-16 LAB
GLUCOSE BLDC GLUCOMTR-MCNC: 85 MG/DL (ref 70–99)
GLUCOSE BLDC GLUCOMTR-MCNC: 86 MG/DL (ref 70–99)
GLUCOSE BLDC GLUCOMTR-MCNC: 96 MG/DL (ref 70–99)
GLUCOSE BLDC GLUCOMTR-MCNC: 99 MG/DL (ref 70–99)

## 2020-09-16 NOTE — PROGRESS NOTES
[facility]    OB/GYNE Progress Note      Indu Sy Patient Status:  Inpatient    1980 MRN [id number]   Location [facility] 3SE Attending Katiuska Sanchez, [address] Day # 2 PCP Darya Johnson        Assessment/Plan ALKPHO 121 (H) 08/19/2020    BILT 0.4 08/19/2020    TP 7.0 08/19/2020    AST 15 09/09/2020    ALT 27 09/09/2020       Lab Results   Component Value Date    COLORUR YELLOW 02/26/2020    CLARITY CLOUDY (A) 02/26/2020    SPECGRAVITY 1.025 09/11/2020    PROUR

## 2020-09-16 NOTE — LACTATION NOTE
LACTATION NOTE - MOTHER      Evaluation Type: Inpatient    Problems identified  Problems identified: Knowledge deficit;Milk supply not WNL  Milk supply not WNL: Reduced (potential)  Problems Identified Other: chooses to formula supplement    Maternal histo

## 2020-09-16 NOTE — PLAN OF CARE
Problem: Patient Centered Care  Goal: Patient preferences are identified and integrated in the patient's plan of care  Description  Interventions:  - What would you like us to know as we care for you?   - Provide timely, complete, and accurate informatio breast  Description  INTERVENTIONS:  - Initiate breast feeding within first hour after birth. - Monitor effectiveness of current breast feeding efforts.   - Assess support systems available to mother/family.  - Identify cultural beliefs/practices regardin measures. Outcome: Progressing  Goal: Appropriate maternal -  bonding  Description  INTERVENTIONS:  - Assess caregiver- interactions. - Assess caregiver's emotional status and coping mechanisms.   - Encourage caregiver to participate in newb as needed  - Establish a toileting routine/schedule  - Consider collaborating with pharmacy to review patient's medication profile  Outcome: Progressing  Goal: Maintains adequate nutritional intake (undernourished)  Description  INTERVENTIONS:  - Monitor p shift. Sat and conversed with patient, encouraged questions, and attentive listening done by RN. Will continue to monitor.

## 2020-09-16 NOTE — LACTATION NOTE
This note was copied from a baby's chart.   LACTATION NOTE - INFANT    Evaluation Type  Evaluation Type: Inpatient    Problems & Assessment  Problems Diagnosed or Identified: Sleepy  Problems: comment/detail: Reports that overnight infant would fall asleep

## 2020-09-16 NOTE — PROGRESS NOTES
Patient consumed breakfast at 0800; RN unaware. Fasting 85 this AM. Educated patient and  on calling RN prior to meals for accuchecks. Patient and  vocalized understanding.

## 2020-09-17 LAB
GLUCOSE BLDC GLUCOMTR-MCNC: 112 MG/DL (ref 70–99)
GLUCOSE BLDC GLUCOMTR-MCNC: 82 MG/DL (ref 70–99)

## 2020-09-17 RX ORDER — IBUPROFEN 600 MG/1
600 TABLET ORAL EVERY 6 HOURS PRN
Status: DISCONTINUED | OUTPATIENT
Start: 2020-09-17 | End: 2020-09-18

## 2020-09-17 NOTE — CM/SW NOTE
MDO:  EPDS 9    CM met w/pt at bedside alone. Infant in crib. CM verified w/ pt demographics on facesheet. Pt lives with her brother, sister in law, and their child in a house.     FOB involved w/infant Kristofer Casas    Infant boy name is Manasa Mejia

## 2020-09-17 NOTE — PLAN OF CARE
Problem: POSTPARTUM  Goal: Long Term Goal:Experiences normal postpartum course  Description  INTERVENTIONS:  - Assess and monitor vital signs and lab values. - Assess fundus and lochia. - Provide ice/sitz baths for perineum discomfort.   - Monitor heali pain/trauma. - Instruct and provide assistance with proper latch. - Review techniques for milk expression (breast pumping) and storage of breast milk. Provide pumping equipment/supplies, instructions and assistance, as needed.   - Encourage rooming-in and to review patient's medication profile  - Implement strategies to promote bladder emptying  Outcome: Progressing

## 2020-09-17 NOTE — PROGRESS NOTES
[facility]    Post-Partum Caesarean Section Progress Note    Tyson Guillen Patient Status:  Inpatient    1980 MRN [id number]   Location [facility] 3SE Attending Eleazar Gosselin, MD   [facility] Day # 3 PCP Juan David Rangel

## 2020-09-18 VITALS
DIASTOLIC BLOOD PRESSURE: 71 MMHG | SYSTOLIC BLOOD PRESSURE: 129 MMHG | HEART RATE: 76 BPM | TEMPERATURE: 99 F | RESPIRATION RATE: 17 BRPM | OXYGEN SATURATION: 99 %

## 2020-09-18 RX ORDER — HYDROCODONE BITARTRATE AND ACETAMINOPHEN 5; 325 MG/1; MG/1
1 TABLET ORAL EVERY 6 HOURS PRN
Qty: 20 TABLET | Refills: 0 | Status: SHIPPED | OUTPATIENT
Start: 2020-09-18 | End: 2020-09-18

## 2020-09-18 RX ORDER — IBUPROFEN 600 MG/1
600 TABLET ORAL EVERY 6 HOURS PRN
Qty: 15 TABLET | Refills: 0 | Status: SHIPPED | OUTPATIENT
Start: 2020-09-18

## 2020-09-18 RX ORDER — HYDROCODONE BITARTRATE AND ACETAMINOPHEN 5; 325 MG/1; MG/1
1 TABLET ORAL EVERY 4 HOURS PRN
Qty: 15 TABLET | Refills: 0 | Status: SHIPPED | OUTPATIENT
Start: 2020-09-18

## 2020-09-18 NOTE — LACTATION NOTE
LACTATION NOTE - MOTHER      Evaluation Type: Inpatient    Problems identified  Problems identified: Milk supply not WNL; Knowledge deficit; Nipple pain  Milk supply not WNL: Reduced (potential)  Problems Identified Other: ABM supplementation    Maternal his

## 2020-09-18 NOTE — PLAN OF CARE
Problem: POSTPARTUM  Goal: Long Term Goal:Experiences normal postpartum course  Description  INTERVENTIONS:  - Assess and monitor vital signs and lab values. - Assess fundus and lochia. - Provide ice/sitz baths for perineum discomfort.   - Monitor heali pain/trauma. - Instruct and provide assistance with proper latch. - Review techniques for milk expression (breast pumping) and storage of breast milk. Provide pumping equipment/supplies, instructions and assistance, as needed.   - Encourage rooming-in and Provide nonpharmacologic comfort measures as appropriate  - Advance diet as tolerated, if ordered  - Obtain nutritional consult as needed  - Evaluate fluid balance  Outcome: Progressing  Goal: Maintains or returns to baseline bowel function  Description  I

## 2020-09-18 NOTE — DISCHARGE SUMMARY
[facility] - [facility]    OB/GYNE Discharge Note      Glenoma Messenger Patient Status:  Inpatient    1980 MRN [id number]   Location [facility] 3SE Attending Martha Mcginnis MD   [address] Day # 4 PCP Dock Chelita date:

## 2020-09-18 NOTE — PAYOR COMM NOTE
--------------  DISCHARGE REVIEW    Payor: Federico Barker #:  [id number]  Authorization Number: [id number]    Admit date: 9/14/20  Admit time:  0  Discharge Date: No discharge date for patient encounter.      Admitt List:     Advanced maternal age, primigravida, antepartum     Placenta previa without hemorrhage, antepartum     Gestational diabetes, diet controlled     Supervision of normal pregnancy     Pruritus of pregnancy in third trimester     Pregnancy     PO 4

## 2020-09-18 NOTE — PROGRESS NOTES
Discharge instructions taught and understood.  refused hospital . He wanted to be his wifes  for instructions. ID bands verified. When to call primary healthcare provider was discussed and patient states understanding.  All quest

## 2020-09-19 ENCOUNTER — TELEPHONE (OUTPATIENT)
Dept: OBGYN CLINIC | Facility: CLINIC | Age: 40
End: 2020-09-19

## 2020-09-19 NOTE — TELEPHONE ENCOUNTER
Pt Name and  verified. Pt scheduled for removal of staples on Monday. This RN also sent pt a Carlotzt message with Colace information and how she can get this otc.

## 2020-09-19 NOTE — TELEPHONE ENCOUNTER
Patient needs to schedule to have staples removed from her  on . [place] Islander speaking. Please advise.

## 2020-09-20 ENCOUNTER — PATIENT MESSAGE (OUTPATIENT)
Dept: OBGYN CLINIC | Facility: CLINIC | Age: 40
End: 2020-09-20

## 2020-09-21 ENCOUNTER — NURSE ONLY (OUTPATIENT)
Dept: OBGYN CLINIC | Facility: CLINIC | Age: 40
End: 2020-09-21
Payer: MEDICAID

## 2020-09-21 VITALS — SYSTOLIC BLOOD PRESSURE: 122 MMHG | TEMPERATURE: 98 F | DIASTOLIC BLOOD PRESSURE: 88 MMHG

## 2020-09-21 DIAGNOSIS — Z48.02 ENCOUNTER FOR STAPLE REMOVAL: Primary | ICD-10-CM

## 2020-09-21 PROCEDURE — 3074F SYST BP LT 130 MM HG: CPT | Performed by: ADVANCED PRACTICE MIDWIFE

## 2020-09-21 PROCEDURE — 3079F DIAST BP 80-89 MM HG: CPT | Performed by: ADVANCED PRACTICE MIDWIFE

## 2020-09-21 PROCEDURE — 99024 POSTOP FOLLOW-UP VISIT: CPT | Performed by: ADVANCED PRACTICE MIDWIFE

## 2020-09-21 NOTE — TELEPHONE ENCOUNTER
From: Bryce Langley  To: Charisma George MD  Sent: 9/20/2020 12:55 PM CDT  Subject: Prescription Question    Kathe Painter Cera tengo helen en el consultorio para retirar puntadas, lindsay quería decirles que me siento muy mal del estomago por que no he p

## 2020-09-21 NOTE — PROGRESS NOTES
Pt Name and  verified. 20  1639   BP: 122/88   Temp: 98 °F (36.7 °C)       Pt here for staple removal. Incision well appoximated and intact, no redness, discharge or swelling.  Staples removed, cleaned with normal saline and hydrogen peroxide and

## 2020-09-22 NOTE — TELEPHONE ENCOUNTER
Pt Name and  verified. Patient wanted to ask in regards to her pain medication and also her current BM issues. States she hasn't really been able to have a regular BM per usual and feels somewhat constipated.  This RN advised pt to push fluids and inc

## 2020-09-25 ENCOUNTER — PATIENT MESSAGE (OUTPATIENT)
Dept: OBGYN CLINIC | Facility: CLINIC | Age: 40
End: 2020-09-25

## 2020-09-25 NOTE — TELEPHONE ENCOUNTER
Pt Name and  verified. Pt worried about her incision site, states that she is concerned that it may be dehisced. Pt scheduled for tomorrow at 11:30. Pt will come in at that time. No further questions.

## 2020-09-26 ENCOUNTER — OFFICE VISIT (OUTPATIENT)
Dept: OBGYN CLINIC | Facility: CLINIC | Age: 40
End: 2020-09-26
Payer: MEDICAID

## 2020-09-26 DIAGNOSIS — L03.90 WOUND CELLULITIS: Primary | ICD-10-CM

## 2020-09-26 PROBLEM — O44.00 PLACENTA PREVIA WITHOUT HEMORRHAGE, ANTEPARTUM: Status: RESOLVED | Noted: 2020-06-02 | Resolved: 2020-09-26

## 2020-09-26 PROBLEM — L29.9 PRURITUS OF PREGNANCY IN THIRD TRIMESTER: Status: RESOLVED | Noted: 2020-08-13 | Resolved: 2020-09-26

## 2020-09-26 PROBLEM — O99.713 PRURITUS OF PREGNANCY IN THIRD TRIMESTER: Status: RESOLVED | Noted: 2020-08-13 | Resolved: 2020-09-26

## 2020-09-26 PROBLEM — Z34.90 PREGNANCY: Status: RESOLVED | Noted: 2020-09-14 | Resolved: 2020-09-26

## 2020-09-26 PROBLEM — Z34.90 SUPERVISION OF NORMAL PREGNANCY: Status: RESOLVED | Noted: 2020-08-12 | Resolved: 2020-09-26

## 2020-09-26 PROBLEM — O09.519 ADVANCED MATERNAL AGE, PRIMIGRAVIDA, ANTEPARTUM: Status: RESOLVED | Noted: 2020-02-12 | Resolved: 2020-09-26

## 2020-09-26 PROCEDURE — 99213 OFFICE O/P EST LOW 20 MIN: CPT | Performed by: OBSTETRICS & GYNECOLOGY

## 2020-09-26 RX ORDER — CEPHALEXIN 250 MG/1
250 CAPSULE ORAL 3 TIMES DAILY
Qty: 21 CAPSULE | Refills: 0 | Status: SHIPPED | OUTPATIENT
Start: 2020-09-26

## 2020-09-26 NOTE — PROGRESS NOTES
HPI:    Patient ID: Kelly Bethea is a 38year old female. HPI  Post  section problem visit  Patient approximately 2 weeks status post primary  section for placenta previa.   Pregnancy complicated by gestational diabetes, cholestasis of p Visit:  Requested Prescriptions     Signed Prescriptions Disp Refills   • cephALEXin 250 MG Oral Cap 21 capsule 0     Sig: Take 1 capsule (250 mg total) by mouth 3 (three) times daily.        Imaging & Referrals:  None       XK#[id number]

## 2020-10-02 ENCOUNTER — OFFICE VISIT (OUTPATIENT)
Dept: OBGYN CLINIC | Facility: CLINIC | Age: 40
End: 2020-10-02
Payer: MEDICAID

## 2020-10-02 VITALS — SYSTOLIC BLOOD PRESSURE: 110 MMHG | WEIGHT: 141 LBS | DIASTOLIC BLOOD PRESSURE: 65 MMHG | BODY MASS INDEX: 27 KG/M2

## 2020-10-02 DIAGNOSIS — L03.90 WOUND CELLULITIS: Primary | ICD-10-CM

## 2020-10-02 PROCEDURE — 3074F SYST BP LT 130 MM HG: CPT | Performed by: OBSTETRICS & GYNECOLOGY

## 2020-10-02 PROCEDURE — 99213 OFFICE O/P EST LOW 20 MIN: CPT | Performed by: OBSTETRICS & GYNECOLOGY

## 2020-10-02 PROCEDURE — 3078F DIAST BP <80 MM HG: CPT | Performed by: OBSTETRICS & GYNECOLOGY

## 2020-10-02 RX ORDER — ACETAMINOPHEN 500 MG
500 TABLET ORAL EVERY 6 HOURS PRN
COMMUNITY

## 2020-10-02 NOTE — PROGRESS NOTES
HPI:    Patient ID: Lynne Lofton is a 38year old female. HPI  Follow-up  Follow-up for postop  focal wound cellulitis. Feels much better. No significant pain. Denies fever chills or drainage. Review of Systems   Constitutional: Negative.

## 2020-10-08 ENCOUNTER — TELEPHONE (OUTPATIENT)
Dept: OBGYN UNIT | Facility: HOSPITAL | Age: 40
End: 2020-10-08

## 2020-10-08 ENCOUNTER — OFFICE VISIT (OUTPATIENT)
Dept: OBGYN CLINIC | Facility: CLINIC | Age: 40
End: 2020-10-08
Payer: MEDICAID

## 2020-10-08 VITALS — DIASTOLIC BLOOD PRESSURE: 70 MMHG | BODY MASS INDEX: 27 KG/M2 | SYSTOLIC BLOOD PRESSURE: 105 MMHG | WEIGHT: 142 LBS

## 2020-10-08 DIAGNOSIS — Z48.89 ENCOUNTER FOR POST SURGICAL WOUND CHECK: Primary | ICD-10-CM

## 2020-10-08 PROCEDURE — 3078F DIAST BP <80 MM HG: CPT | Performed by: NURSE PRACTITIONER

## 2020-10-08 PROCEDURE — 99024 POSTOP FOLLOW-UP VISIT: CPT | Performed by: NURSE PRACTITIONER

## 2020-10-08 PROCEDURE — 3074F SYST BP LT 130 MM HG: CPT | Performed by: NURSE PRACTITIONER

## 2020-10-08 NOTE — PROGRESS NOTES
[facility], [facility]  Obstetrics and Gynecology  Post Operative Wound Check  Francoise Horner, MSN, APRN, FNP-BC    HPI   Patient presents to the clinic for a post operative wound check. She had a primary C/S on 9/14/20.   She was seen in the office by Dr. Av Cooper on (Patient not taking: Reported on 10/8/2020 ) 1 each 0       ALLERGIES     Naproxen                HIVES    ROS   Review of Systems   Constitutional: Negative for chills and fever. Generalized aching, feels hot and cold throughout the night.    All ot No prescriptions requested or ordered in this encounter       IMAGING/REFERRALS   None     Bisi Rucker  10/8/2020  1:44 PM

## 2020-10-22 ENCOUNTER — TELEPHONE (OUTPATIENT)
Dept: OBGYN CLINIC | Facility: CLINIC | Age: 40
End: 2020-10-22

## 2020-10-22 NOTE — TELEPHONE ENCOUNTER
Pharmacy -[facility] in [place] called asking to get refill for patient. They are looking for refill on test strips for 1-touch verio hernandez.

## 2020-10-23 NOTE — TELEPHONE ENCOUNTER
Resending to MD for clarification, pt with history of delivery by  20 asking if its necessary to still keep diabetic diet and continue checking blood sugars at home. If so was requesting a prior authorization for test strips/supplies.  Per pt

## 2020-10-29 ENCOUNTER — POSTPARTUM (OUTPATIENT)
Dept: OBGYN CLINIC | Facility: CLINIC | Age: 40
End: 2020-10-29
Payer: MEDICAID

## 2020-10-29 VITALS — WEIGHT: 146 LBS | BODY MASS INDEX: 28 KG/M2 | DIASTOLIC BLOOD PRESSURE: 62 MMHG | SYSTOLIC BLOOD PRESSURE: 110 MMHG

## 2020-10-29 PROCEDURE — 3078F DIAST BP <80 MM HG: CPT | Performed by: OBSTETRICS & GYNECOLOGY

## 2020-10-29 PROCEDURE — 3074F SYST BP LT 130 MM HG: CPT | Performed by: OBSTETRICS & GYNECOLOGY

## 2020-10-29 NOTE — PROGRESS NOTES
HPI:   Vick Crespo is a 38year old female who presents for a pp visit. Pt counseled and pt wants to schedule mirena iud next week.       Wt Readings from Last 6 Encounters:  10/29/20 : 146 lb (66.2 kg)  10/08/20 : 142 lb (64.4 kg)  10/02/20 : 141 lb (6 structural abnormalities seen  4. Complete posterior placenta previa   Recommendations 1. Plan delivery at 37 weeks 2.           Growth ultrasound in 6-8 weeks   • Sinus bradycardia 2016      Past Surgical History:   Procedure Laterality Date tenderness, incision well healed.    :introitus is normal,scant discharge,cervix is pink,no adnexal masses or tenderness,  MUSCULOSKELETAL: back is not tender,FROM of the back  EXTREMITIES: no cyanosis, clubbing or edema  NEURO: Oriented times three

## 2020-12-03 ENCOUNTER — TELEPHONE (OUTPATIENT)
Dept: OBGYN CLINIC | Facility: CLINIC | Age: 40
End: 2020-12-03

## 2020-12-03 NOTE — TELEPHONE ENCOUNTER
Pt Name and  verified. Pt is calling due to severe bleeding. She states that she has gone through a large pad every hour since last night. Denies any dizziness, weakness or fatigue. Denies any cramping or back pain.  States she does feel a type of 'pu

## 2020-12-03 NOTE — TELEPHONE ENCOUNTER
I spoke with patient on the phone. She had heavy bleeding over the last day. She is status post  section on 9/15/2020. She had minimal lochia that was off and on for approximately 3 to 4 weeks. She is bottlefeeding.   She thinks this may be her

## 2020-12-03 NOTE — TELEPHONE ENCOUNTER
Patient is calling because she is bleeding heavy. She had a  on . She is having her 1st period and she is changing her pad 1 per hour. Please advise.

## 2025-01-06 NOTE — PLAN OF CARE
Consent: Written consent obtained.  The risks were reviewed with the patient including but not limited to: pigmentary changes, pain, blistering, scabbing, redness, and the remote possibility of scarring. Problem: Patient Centered Care  Goal: Patient preferences are identified and integrated in the patient's plan of care  Description  Interventions:  - What would you like us to know as we care for you?   - Provide timely, complete, and accurate informatio Which Photosensitizer Was Used: Levulan information as needed about early infant feeding cues (e.g., rooting, lip smacking, sucking fingers/hand) versus late cue of crying.  - Discuss/demonstrate breast feeding aids (e.g., infant sling, nursing footstool/pillows, and breast pumps).   - Encourage Show Inventory Tab: Show anxiety  - Utilize distraction and/or relaxation techniques  - Monitor for opioid side effects  - Notify MD/LIP if interventions unsuccessful or patient reports new pain  - Anticipate increased pain with activity and pre-medicate as appropriate  Outcome: P Occlusion: No collaborating with pharmacy to review patient's medication profile  - Implement strategies to promote bladder emptying  Outcome: Progressing Number Of Kerasticks/Tubes Billed For: 1 Debridement Text (Will Only Render In Visit Note If You Select Debridement Option Under Who Performed The Pdt Field): Prior to application of the photodynamic medication the hyperkeratotic lesions were debrided to make them more amenable to therapy. Detail Level: Zone Show Anesthesia In Plan?: Yes Who Performed The Pdt (Provider): Dr. Man Comments: **VALERIO DERM LEVULAN STOCK USED** Total Number Of Aks Treated (Optional To Report): 0 Pre-Procedure Text: The treatment areas were cleaned and prepped in the usual fashion. Physician or healthcare professional was present for all aspect of treatment today including pre and post op monitoring of the patient.\\ngoggles used by patient during entire tx course (special gonzalo u protective googles) Anesthesia Type: 1% lidocaine with epinephrine Illumination Time: 00:16:40 Medical Necessity: Precancerous Lesions Light Source: Kar-U Post-Care Instructions: I reviewed with the patient in detail post-care instructions. Patient is to avoid sunlight for the next 2 days, and wear sun protection. Patients may expect sunburn like redness, discomfort and scabbing. Incubation Time: 01:00:00 Who Performed The Pdt?: Performed by MD EDWINA, ARJUN or SALOME with Pre-Procedure Debridement of Hyperkeratotic Lesions ([id number]) Lot # (Optional): xx

## (undated) NOTE — LETTER
3/18/2020              Indu Sy        [facility] [id number]         To Whom It May Concern:      Indu Sy, is currently under our medical care for her current pregnancy. She is currently 11 weeks and 2 days.  If you ne

## (undated) NOTE — LETTER
[facility]  Administration of Anesthesia  1. Kaleb Thomson, or _________________________________ acting on her behalf, (Patient) (Dependent/Representative) request to receive anesthesia for my pending procedure/operation/treatment.   CARLYLE sanders infections, high spinal block, spinal bleeding, seizure, cardiac arrest and death. 7. AWARENESS: I understand that it is possible (but unlikely) to have explicit memory of events from the operating room while under general anesthesia.   8. ELECTROCONVULSIV unconscious pt /Relationship    My signature below affirms that prior to the time of the procedure, I have explained to the patient and/or his/her guardian, the risks and benefits of undergoing anesthesia, as well as any reasonable alternatives.     _______

## (undated) NOTE — LETTER
[address]      Authorization for Surgical Operation and Procedure     Date:_09/14/20__________                                                                                                         Time 4.   Should the need arise during my operation or immediate post-operative period, I also consent to the administration of blood and/or blood products.   Further, I understand that despite careful testing and screening of blood or blood products by haley 8.   I recognize that in the event my procedure results in extended X-Ray/fluoroscopy time, I may develop a skin reaction. 9.  If I have a Do Not Attempt Resuscitation (DNAR) order in place, that status will be suspended while in the operating room, proc STATEMENT OF PHYSICIAN My signature below affirms that prior to the time of the procedure; I have explained to the patient and/or his/her legal representative, the risks and benefits involved in the proposed treatment and any reasonable alternative to the

## (undated) NOTE — LETTER
[address]  [address] [place], [place]      Authorization for Surgical Operation and Procedure     Date:_09/14/2020__________                                                                                                         Ti potential risks that can occur: fever and allergic reactions, hemolytic reactions, transmission of diseases such as Hepatitis, AIDS and Cytomegalovirus (CMV) and fluid overload.   In the event that I wish to have an autologous transfusion of my own blood, o attending physician will determine when the applicable recovery period ends for purposes of reinstating the DNAR order.   10. Patients having a sterilization procedure: I understand that if the procedure is successful the results will be permanent and it wi _______________________________________________________________ _____________________________  Reji Perry Physician)                                                                                         (Date)                                   (Time)

## (undated) NOTE — LETTER
VACCINE ADMINISTRATION RECORD  PARENT / GUARDIAN APPROVAL  Date: 2020  Vaccine administered to: Trish Montelongo     : 1980    MRN: [id number]    A copy of the appropriate Centers for Disease Control and Prevention Vaccine Information statement ha

## (undated) NOTE — LETTER
[address]      Authorization for Surgical Operation and Procedure     Date:09/14/20__________                                                                                                         Time: 4.   Should the need arise during my operation or immediate post-operative period, I also consent to the administration of blood and/or blood products.   Further, I understand that despite careful testing and screening of blood or blood products by haley 8.   I recognize that in the event my procedure results in extended X-Ray/fluoroscopy time, I may develop a skin reaction. 9.  If I have a Do Not Attempt Resuscitation (DNAR) order in place, that status will be suspended while in the operating room, proc STATEMENT OF PHYSICIAN My signature below affirms that prior to the time of the procedure; I have explained to the patient and/or his/her legal representative, the risks and benefits involved in the proposed treatment and any reasonable alternative to the

## (undated) NOTE — LETTER
03/14/20        Minnette Wakulla  [address]      [address],    Nuestros registros indican que tiene análisis clínicos pendientes y/o pruebas que se ordenaron en noel nombre que no se mas completado.  [address] [facility]